# Patient Record
Sex: FEMALE | Race: WHITE | NOT HISPANIC OR LATINO | Employment: PART TIME | URBAN - METROPOLITAN AREA
[De-identification: names, ages, dates, MRNs, and addresses within clinical notes are randomized per-mention and may not be internally consistent; named-entity substitution may affect disease eponyms.]

---

## 2017-09-08 ENCOUNTER — TRANSFERRED RECORDS (OUTPATIENT)
Dept: HEALTH INFORMATION MANAGEMENT | Facility: CLINIC | Age: 67
End: 2017-09-08

## 2017-12-14 ENCOUNTER — TRANSFERRED RECORDS (OUTPATIENT)
Dept: HEALTH INFORMATION MANAGEMENT | Facility: CLINIC | Age: 67
End: 2017-12-14

## 2018-02-09 ENCOUNTER — OFFICE VISIT (OUTPATIENT)
Dept: OTOLARYNGOLOGY | Facility: CLINIC | Age: 68
End: 2018-02-09
Payer: MEDICARE

## 2018-02-09 DIAGNOSIS — J34.89 NASAL SEPTAL PERFORATION: Primary | ICD-10-CM

## 2018-02-09 RX ORDER — LEVOTHYROXINE SODIUM 125 UG/1
125 TABLET ORAL DAILY
COMMUNITY
Start: 2018-01-17

## 2018-02-09 RX ORDER — MONTELUKAST SODIUM 10 MG/1
10 TABLET ORAL AT BEDTIME
COMMUNITY
Start: 2018-02-02

## 2018-02-09 RX ORDER — TEMAZEPAM 30 MG
30 CAPSULE ORAL
COMMUNITY
Start: 2017-08-08

## 2018-02-09 RX ORDER — ESTRADIOL 0.5 MG/1
0.5 TABLET ORAL DAILY
COMMUNITY
Start: 2018-02-02

## 2018-02-09 NOTE — PROGRESS NOTES
"Facial Plastic and Reconstructive Surgery Consultation    Referring Provider:   No referring provider defined for this encounter.    HPI:   I had the pleasure of seeing Leslye Lea today in clinic for consultation for septal perforation.   Leslye Lea is a 67 year old female who presents for management of a septal perforation.   She has never had nasal or septal surgery. She does not have autoimmune disease. She did have nasal trauma when she walked into a door at night. She feels like there may have been \"a bubble and some blood\" that came from the area.     She has seen an otolaryngologist who has performed and autoimmune work up in addition to biopsies. She reports the findings to be negative.    She is disturbed by the whistling and uses acquaphor to cover the hole at night to sleep. She has a history of sinus infections.           Review Of Systems  ROS: 10 point ROS neg other than the symptoms noted above in the HPI.    There is no problem list on file for this patient.    Past Surgical History:   Procedure Laterality Date     BUNIONECTOMY Bilateral      HYSTERECTOMY  2000     THYROIDECTOMY  1999     Current Outpatient Prescriptions   Medication Sig Dispense Refill     estradiol (ESTRACE) 1 MG tablet Take 1 mg by mouth       levothyroxine (SYNTHROID/LEVOTHROID) 125 MCG tablet Take 125 mcg by mouth       montelukast (SINGULAIR) 10 MG tablet Take 10 mg by mouth       temazepam (RESTORIL) 30 MG capsule Take 30 mg by mouth       Seasonal allergies  Social History     Social History     Marital status:      Spouse name: N/A     Number of children: N/A     Years of education: N/A     Occupational History     Not on file.     Social History Main Topics     Smoking status: Not on file     Smokeless tobacco: Not on file     Alcohol use Not on file     Drug use: Not on file     Sexual activity: Not on file     Other Topics Concern     Not on file     Social History Narrative     No narrative on file "     No family history on file.    PE:  Alert and Oriented, Answering Questions Appropriately  Atraumatic, Normocephalic, Face Symmetric  Skin: Forrester 2  Facial Nerve Intact and facial movement symmetric  EOM's, PEERL  Nasal Exam: No external Deformity, no mucopurulence or polyps, no masses, 3mm septal perforation anterior, mucosa looks irritated and dry bilaterally, no ulcerations of lesions otherwise  Chin: Normal   Lips/Teeth/Toungue/Gums: Lips intact, Normal Dentition, Occlusion intact, Oral mucosa intact, no lesions/ulcerations/masses, Tongue mobile  Neck: No lymphadenopathy, no thyromegaly, trachea midline  Chest: No wheezing, cyanosis, or stridor  Card: Normal upper extremity pulses and capillary refill, not diaphoretic  Neuro/Psych: CN's 2-12 intact, Moves all extremities, ambulation in intact, positive affect, no notable muscle weakness      IMPRESSION:    Septal perforation      PLAN:    Leslye Lea has a septal perforation, likely from trauma. I will need to obtain her outside records for work up.  I have placed her on maximal moisture therapy.  I will see her back in 3 months.  I think this is nicely amenable for repair.      Photodocumentation was obtained.     I spent a total of 30 minutes face-to-face with Leslye Lea during today's office visit.  Over 50% of this time was spent counseling the patient and/or coordinating care regarding her septal perforation.  See note for details.

## 2018-02-09 NOTE — LETTER
"2/9/2018       RE: Leslye Lea  [address] [facility] [address]     Dear Colleague,    Thank you for referring your patient, Leslye Lea, to the [facility] at [facility]. Please see a copy of my visit note below.    Facial Plastic and Reconstructive Surgery Consultation    Referring Provider:   No referring provider defined for this encounter.    HPI:   I had the pleasure of seeing Leslye Lea today in clinic for consultation for septal perforation.   Lesley Lea is a 67 year old female who presents for management of a septal perforation.   She has never had nasal or septal surgery. She does not have autoimmune disease. She did have nasal trauma when she walked into a door at night. She feels like there may have been \"a bubble and some blood\" that came from the area.     She has seen an otolaryngologist who has performed and autoimmune work up in addition to biopsies. She reports the findings to be negative.    She is disturbed by the whistling and uses acquaphor to cover the hole at night to sleep. She has a history of sinus infections.     Review Of Systems  ROS: 10 point ROS neg other than the symptoms noted above in the HPI.    There is no problem list on file for this patient.    Past Surgical History:   Procedure Laterality Date     BUNIONECTOMY Bilateral      HYSTERECTOMY  2000     THYROIDECTOMY  1999     Current Outpatient Prescriptions   Medication Sig Dispense Refill     estradiol (ESTRACE) 1 MG tablet Take 1 mg by mouth       levothyroxine (SYNTHROID/LEVOTHROID) 125 MCG tablet Take 125 mcg by mouth       montelukast (SINGULAIR) 10 MG tablet Take 10 mg by mouth       temazepam (RESTORIL) 30 MG capsule Take 30 mg by mouth       Seasonal allergies  Social History     Social History     Marital status:      Spouse name: N/A     Number of children: N/A     Years of education: N/A     Occupational History     Not on file.     Social History " Main Topics     Smoking status: Not on file     Smokeless tobacco: Not on file     Alcohol use Not on file     Drug use: Not on file     Sexual activity: Not on file     Other Topics Concern     Not on file     Social History Narrative     No narrative on file     No family history on file.    PE:  Alert and Oriented, Answering Questions Appropriately  Atraumatic, Normocephalic, Face Symmetric  Skin: Forrester 2  Facial Nerve Intact and facial movement symmetric  EOM's, PEERL  Nasal Exam: No external Deformity, no mucopurulence or polyps, no masses, 3mm septal perforation anterior, mucosa looks irritated and dry bilaterally, no ulcerations of lesions otherwise  Chin: Normal   Lips/Teeth/Toungue/Gums: Lips intact, Normal Dentition, Occlusion intact, Oral mucosa intact, no lesions/ulcerations/masses, Tongue mobile  Neck: No lymphadenopathy, no thyromegaly, trachea midline  Chest: No wheezing, cyanosis, or stridor  Card: Normal upper extremity pulses and capillary refill, not diaphoretic  Neuro/Psych: CN's 2-12 intact, Moves all extremities, ambulation in intact, positive affect, no notable muscle weakness      IMPRESSION:    Septal perforation      PLAN:    Leslye Lea has a septal perforation, likely from trauma. I will need to obtain her outside records for work up.  I have placed her on maximal moisture therapy.  I will see her back in 3 months.  I think this is nicely amenable for repair.    Photodocumentation was obtained.     I spent a total of 30 minutes face-to-face with Leslye Lea during today's office visit.  Over 50% of this time was spent counseling the patient and/or coordinating care regarding her septal perforation.  See note for details.      Sammie Faria MD

## 2018-02-09 NOTE — NURSING NOTE
Photodocumentation taken.  Saline Mist 3x/day, Saline Gel 2x/day, Aquaphor at night.  Follow up in 3 months.      Macho Velazquez RN  2/9/2018 3:49 PM

## 2018-02-22 RX ORDER — ALPRAZOLAM 0.5 MG
0.5 TABLET ORAL AT BEDTIME
COMMUNITY
Start: 2016-10-06 | End: 2023-11-06

## 2018-02-22 RX ORDER — PYRIDOXINE HCL (VITAMIN B6) 100 MG
1000 TABLET ORAL DAILY
COMMUNITY

## 2018-02-22 RX ORDER — BUPROPION HYDROCHLORIDE 150 MG/1
300 TABLET ORAL DAILY
COMMUNITY
Start: 2018-02-02 | End: 2023-11-06

## 2018-02-22 RX ORDER — DIPHENOXYLATE HYDROCHLORIDE AND ATROPINE SULFATE 2.5; .025 MG/1; MG/1
500 TABLET ORAL DAILY
COMMUNITY

## 2018-02-22 RX ORDER — LORATADINE 10 MG/1
10 TABLET ORAL DAILY
COMMUNITY
End: 2023-11-06

## 2018-05-11 ENCOUNTER — OFFICE VISIT (OUTPATIENT)
Dept: OTOLARYNGOLOGY | Facility: CLINIC | Age: 68
End: 2018-05-11
Payer: MEDICARE

## 2018-05-11 DIAGNOSIS — J34.89 NASAL SEPTAL PERFORATION: Primary | ICD-10-CM

## 2018-05-11 NOTE — LETTER
5/11/2018     RE: Leslye Lea  [address] [facility] [address]     Dear Colleague,    Thank you for referring your patient, Leslye Lea, to the [facility] ENT [facility] at [facility]. Please see a copy of my visit note below.    Facial Plastic and Reconstructive Surgery    Leslye Lea has been doing well  She continues to have the 3 mm perforation and is incredibly bothered by it. She cannot sleep at night because of the whistling.    She has vasomotor rhinitis and feels like th ayr gel makes it worse. So she has only been using acquaphor. The lining looks stable, in fact, improved.     I think she is a great candidate for surgery. We talked about the risks, primarily being recurrence of the perforation. She understands I would need to use Alloderm.    She is interested in pursing surgery in the fall. I would approach it through and endonasal approach.     Again, thank you for allowing me to participate in the care of your patient.      Sincerely,    Sammie Faria MD

## 2018-05-11 NOTE — NURSING NOTE
Photodocumentation obtained.  Pre Op teaching completed, gave her soap.  Will obtain PA and then coordinate surgery.    Macho Velazquez RN  5/11/2018 3:34 PM

## 2018-05-11 NOTE — PROGRESS NOTES
Facial Plastic and Reconstructive Surgery    Leslyejoe Lea has been doing well  She continues to have the 3 mm perforation and is incredibly bothered by it. She cannot sleep at night because of the whistling.    She has vasomotor rhinitis and feels like th ayr gel makes it worse. So she has only been using acquaphor. The lining looks stable, in fact, improved.     I think she is a great candidate for surgery. We talked about the risks, primarily being recurrence of the perforation. She understands I would need to use Alloderm.    She is interested in pursing surgery in the fall. I would approach it through and endonasal approach.

## 2018-05-14 DIAGNOSIS — J34.89 NASAL SEPTAL PERFORATION: Primary | ICD-10-CM

## 2018-05-14 RX ORDER — CEFAZOLIN SODIUM 1 G/50ML
1 INJECTION, SOLUTION INTRAVENOUS SEE ADMIN INSTRUCTIONS
Status: CANCELLED | OUTPATIENT
Start: 2018-05-14

## 2018-05-16 ENCOUNTER — TELEPHONE (OUTPATIENT)
Dept: OTOLARYNGOLOGY | Facility: CLINIC | Age: 68
End: 2018-05-16

## 2018-05-16 NOTE — TELEPHONE ENCOUNTER
Called patient and scheduled her surgery with Dr Faria on 9/27/18 @ [facility] per patient request.  Surgery packet, discharge instructions and postop appt 10/5/18 @ 10am at [facility] sent to patient.

## 2018-08-23 ENCOUNTER — TRANSFERRED RECORDS (OUTPATIENT)
Dept: HEALTH INFORMATION MANAGEMENT | Facility: CLINIC | Age: 68
End: 2018-08-23

## 2018-08-27 ENCOUNTER — TELEPHONE (OUTPATIENT)
Dept: OTOLARYNGOLOGY | Facility: CLINIC | Age: 68
End: 2018-08-27

## 2018-08-27 NOTE — TELEPHONE ENCOUNTER
Patient called to report that she was seen in urgent care over the weekend for a reaction to keflex.  She states she had generalized itching all over her body and her lip was swollen. She was then switched to Augmentin 875mg.  She states the itching has resolved however she now feels she has a sinus infection.  Arranged for patient to see Dr. Griffith tomorrow.  She feels surgery went great, just not feeling good from the sinus infection.    Macho Velazquez RN  8/27/2018 10:35 AM

## 2018-08-28 ENCOUNTER — OFFICE VISIT (OUTPATIENT)
Dept: OTOLARYNGOLOGY | Facility: CLINIC | Age: 68
End: 2018-08-28

## 2018-08-28 DIAGNOSIS — Z98.890 POSTOPERATIVE STATE: Primary | ICD-10-CM

## 2018-08-28 NOTE — LETTER
"8/28/2018       RE: Leslye Lea  [address] [facility] [address]     Dear Colleague,    Thank you for referring your patient, Leslye Lea, to the [facility] ENT [facility] at [facility]. Please see a copy of my visit note below.    Post-surgical follow up  August 28, 2018    Leslye was seen in follow up today.  She is one week s/p septal perforation repair with Dr. Faria.  Post-operatively she was put on keflex, however, she reports she developed itchiness with this and was seen in urgent care.  Her antibiotic was changed to augmentin, which she reports she has tolerated without issue.  She reports a history of 4-5 sinus infections per year and reports that she currently feels that she has an infection.  She reports chills but denies having any fevers, she denies green/yellow nasal discharge, and denies facial pain/pressure but explains that she feels \"toxic\", fatigued, and congested as she usually feels when she has a sinus infection.    On exam, Her incisions are healing well.  She has silastic splints sutured in place. On anterior rhinoscopy she has no purulent nasal drainage but does have quite a bit of clear, watery secretions.  Her turbinates are enlarged and nasal passage was are narrow.     I reassured her today and told her that everything looks to be healing well.  I discussed that the splints need to remain in place and she should continue using her nasal saline spray and ointment to her incisions.  I did give her an additional 5 days of antibiotics as well as diflucan per her request.      Again, thank you for allowing me to participate in the care of your patient.      Sincerely,    Raegan Griffith MD      "

## 2018-08-29 NOTE — PROGRESS NOTES
"Post-surgical follow up  August 28, 2018    Leslye was seen in follow up today.  She is one week s/p septal perforation repair with Dr. Faria.  Post-operatively she was put on keflex, however, she reports she developed itchiness with this and was seen in urgent care.  Her antibiotic was changed to augmentin, which she reports she has tolerated without issue.  She reports a history of 4-5 sinus infections per year and reports that she currently feels that she has an infection.  She reports chills but denies having any fevers, she denies green/yellow nasal discharge, and denies facial pain/pressure but explains that she feels \"toxic\", fatigued, and congested as she usually feels when she has a sinus infection.    On exam, Her incisions are healing well.  She has silastic splints sutured in place. On anterior rhinoscopy she has no purulent nasal drainage but does have quite a bit of clear, watery secretions.  Her turbinates are enlarged and nasal passage was are narrow.     I reassured her today and told her that everything looks to be healing well.  I discussed that the splints need to remain in place and she should continue using her nasal saline spray and ointment to her incisions.  I did give her an additional 5 days of antibiotics as well as diflucan per her request.    "

## 2018-09-07 ENCOUNTER — OFFICE VISIT (OUTPATIENT)
Dept: OTOLARYNGOLOGY | Facility: CLINIC | Age: 68
End: 2018-09-07
Payer: MEDICARE

## 2018-09-07 DIAGNOSIS — Z98.890 POSTOPERATIVE STATE: Primary | ICD-10-CM

## 2018-09-07 NOTE — PROGRESS NOTES
Facial Plastic and Reconstructive Surgery    Leslyelouie Lea presents s/p repair of a septal perforation  She was troubled post operatively with sinusitis    I removed splints today, the perforation is closed, but on the left the mucosa looks bruised and fragile, the left is completely intact.    I have counseled her against nasal manipulation  I have her using Ayr gel.    I will see her in a month, she is traveling to [facility]

## 2018-09-07 NOTE — LETTER
9/7/2018       RE: Leslye Lea  [address] [facility] [address]     Dear Colleague,    Thank you for referring your patient, Leslye Lea, to the [facility] at [facility]. Please see a copy of my visit note below.    Facial Plastic and Reconstructive Surgery    Leslye Lea presents s/p repair of a septal perforation  She was troubled post operatively with sinusitis    I removed splints today, the perforation is closed, but on the left the mucosa looks bruised and fragile, the left is completely intact.    I have counseled her against nasal manipulation  I have her using Ayr gel.    I will see her in a month, she is traveling to [facility]    Again, thank you for allowing me to participate in the care of your patient.      Sincerely,    Sammie Faria MD

## 2018-11-30 ENCOUNTER — OFFICE VISIT (OUTPATIENT)
Dept: OTOLARYNGOLOGY | Facility: CLINIC | Age: 68
End: 2018-11-30
Payer: MEDICARE

## 2018-11-30 DIAGNOSIS — H57.813 BROW PTOSIS, BILATERAL: Primary | ICD-10-CM

## 2018-11-30 NOTE — PROGRESS NOTES
Facial Plastic and Reconstructive Surgery      Leslye Lea had a septal perforation repair in September  She then took a trip  It was quite dry and friable when I saw her in the post op period    She was allergic to the Ayr gel, she brought in her extras today  She uses acquaphor    Her septum is closed nicely  I am very happy.  It is a little dry, she needs to ramp up moisture.    She does however complain of visual obstruction.    FUNCTIONAL COMPLAINTS RELATED TO DROOPY EYELIDS/BROWS:  Leslye Lea describes upper lids interfering with superior visual field and interfering with activities of daily living including reading, driving and watching television.      EXAM: Dermatochalasis with excess skin touching the eyelids  Brow ptosis with brow resting below the superior orbital rim  Lids resting on eyelashes obstructing the temporal visual axis     MRD1: Right eye 0   Left eye 0    I have referred her for visual field testing today.

## 2018-11-30 NOTE — LETTER
11/30/2018       RE: Leslye Lea  [address] [facility] [address]     Dear Colleague,    Thank you for referring your patient, Leslye Lea, to the [facility] ENT [facility] at [facility]. Please see a copy of my visit note below.    Facial Plastic and Reconstructive Surgery    Leslye Lea had a septal perforation repair in September  She then took a trip  It was quite dry and friable when I saw her in the post op period    She was allergic to the Ayr gel, she brought in her extras today  She uses acquaphor    Her septum is closed nicely  I am very happy.  It is a little dry, she needs to ramp up moisture.    She does however complain of visual obstruction.    FUNCTIONAL COMPLAINTS RELATED TO DROOPY EYELIDS/BROWS:  Leslye Lea describes upper lids interfering with superior visual field and interfering with activities of daily living including reading, driving and watching television.      EXAM: Dermatochalasis with excess skin touching the eyelids  Brow ptosis with brow resting below the superior orbital rim  Lids resting on eyelashes obstructing the temporal visual axis     MRD1: Right eye 0   Left eye 0  I have referred her for visual field testing today.    Sammie Faria MD

## 2018-12-03 NOTE — NURSING NOTE
Updated photodocumentation obtained.  Patient does want to have Dr. Twin Frazier do her upper eyelid surgery for her.  She will need VFT done - I highly recommend that she go to our eye clinic to have that done - she understands but wants to wait a little longer before having the surgery.      She will call me back when she is ready.  I did tell her that there is a lot of time needed up front for prior authorization process, she understands that and will call.    Macho Velazquez RN  12/3/2018 9:10 AM

## 2019-03-29 ENCOUNTER — ALLIED HEALTH/NURSE VISIT (OUTPATIENT)
Dept: OPHTHALMOLOGY | Facility: CLINIC | Age: 69
End: 2019-03-29
Payer: MEDICARE

## 2019-03-29 DIAGNOSIS — H02.419 MECHANICAL PTOSIS: Primary | ICD-10-CM

## 2019-07-15 ENCOUNTER — TELEPHONE (OUTPATIENT)
Dept: PLASTIC SURGERY | Facility: CLINIC | Age: 69
End: 2019-07-15

## 2019-07-15 DIAGNOSIS — H57.813 BROW PTOSIS, BILATERAL: ICD-10-CM

## 2019-07-15 DIAGNOSIS — H02.839 DERMATOCHALASIA: Primary | ICD-10-CM

## 2019-07-15 DIAGNOSIS — H53.40 VISUAL FIELD DEFECT: ICD-10-CM

## 2019-07-15 NOTE — TELEPHONE ENCOUNTER
Patient called and expressed that she would like to proceed having upper eyelid and brow surgery.  Patient has had visual field testing done.  Will work to obtain PA and then work to schedule the surgery.      Patient would like to return to clinic at some point to discuss surgeries again.    Macho Velazquez RN  7/15/2019 10:27 AM

## 2019-09-26 NOTE — TELEPHONE ENCOUNTER
09/04/19 per Sandi Gant surgery on 10/10/19 she is having a knee replacement and will call us when she is ready to schedule.

## 2020-03-11 ENCOUNTER — HEALTH MAINTENANCE LETTER (OUTPATIENT)
Age: 70
End: 2020-03-11

## 2021-01-03 ENCOUNTER — HEALTH MAINTENANCE LETTER (OUTPATIENT)
Age: 71
End: 2021-01-03

## 2021-02-05 ENCOUNTER — OFFICE VISIT (OUTPATIENT)
Dept: PLASTIC SURGERY | Facility: CLINIC | Age: 71
End: 2021-02-05
Payer: MEDICARE

## 2021-02-05 DIAGNOSIS — H53.483 VISUAL FIELD CONTRACTION, BILATERAL: ICD-10-CM

## 2021-02-05 DIAGNOSIS — H02.834 DERMATOCHALASIS OF BOTH UPPER EYELIDS: ICD-10-CM

## 2021-02-05 DIAGNOSIS — H57.819 BROW PTOSIS: Primary | ICD-10-CM

## 2021-02-05 DIAGNOSIS — H53.40 VISUAL FIELD DEFECT: ICD-10-CM

## 2021-02-05 DIAGNOSIS — H02.831 DERMATOCHALASIS OF BOTH UPPER EYELIDS: ICD-10-CM

## 2021-02-05 NOTE — LETTER
2/5/2021       RE: Leslye Lea  [address] [facility] [address]     Dear Colleague,    Thank you for referring your patient, Leslye Lea, to the [facility] at [facility]. Please see a copy of my visit note below.    Facial Plastic and Reconstructive Surgery    Leslye Lea comes in today to discuss brow lift once again.  She has had a failed visual field test in the past with significant brow ptosis on examination.  We discussed brow lift procedure in the past at that time however she required right knee replacement surgery and proceeded with that.  She has recovered well since that and is now here to discuss correcting her brow position.  She feels like it is quite troublesome.  She has significant difficulty in the evenings when she is tired but she also has feels like she constantly has a curtain over the corner of her eyes.  The right is more symptomatic than the left.  If she holds her brow superiorly she says it completely resolves her problem.    PMH, PSH, medications, allergies were reviewed and are unchanged since last visit.  She has since last seen had her right knee replaced.  She currently has some loose ligaments.    On examination she has bilateral brow ptosis that significant.  Her hairbearing brow is below the superior orbital rim bilaterally.  She has significant excess of skin with hooding contacting and over laughing her eye lashes.  The breast can be elevated manually and this leads to significant improvement in her visual field.  She notes it is a significant before and after change with quite a direct correction. Lids resting on eyelashes obstructing the temporal visual axis.  Intranasal examination demonstrates that her septum is nicely repaired with no evidence of recurrence of perforation.  She has a little bit of dried mucus on the left side of encourage her to use some ointment.    Assessment and plan:    Leslye Lea  describes upper lids interfering with superior visual field and interfering with activities of daily living including reading, driving and watching television.  She has failed a visual field test in the past and we will see if she needs to have a repeat one in order to qualify for surgical correction.     She would benefit from bilateral brow ptosis.  This is not just an upper eyelid skin problem is primarily due to brow ptosis and she would require elevation of both brows.  I discussed that surgery with her today.  She is interested in proceeding.  I think this is fundamental for her vision and her safety and she feels quite motivated to have surgery due to the significant functional deficit that she feels.    I spent a total of  25 minutes face-to-face with Leslye Lea during today's office visit.  Over 50% of this time was spent counseling the patient and/or coordinating care regarding visual field obstruction from brow ptosis.  See note for details.    Again, thank you for allowing me to participate in the care of your patient.      Sincerely,    Sammie Faria MD

## 2021-02-05 NOTE — PROGRESS NOTES
Facial Plastic and Reconstructive Surgery      Leslye Lea comes in today to discuss brow lift once again.  She has had a failed visual field test in the past with significant brow ptosis on examination.  We discussed brow lift procedure in the past at that time however she required right knee replacement surgery and proceeded with that.  She has recovered well since that and is now here to discuss correcting her brow position.  She feels like it is quite troublesome.  She has significant difficulty in the evenings when she is tired but she also has feels like she constantly has a curtain over the corner of her eyes.  The right is more symptomatic than the left.  If she holds her brow superiorly she says it completely resolves her problem.    PMH, PSH, medications, allergies were reviewed and are unchanged since last visit.  She has since last seen had her right knee replaced.  She currently has some loose ligaments.    On examination she has bilateral brow ptosis that significant.  Her hairbearing brow is below the superior orbital rim bilaterally.  She has significant excess of skin with hooding contacting and over laughing her eye lashes.  The breast can be elevated manually and this leads to significant improvement in her visual field.  She notes it is a significant before and after change with quite a direct correction. Lids resting on eyelashes obstructing the temporal visual axis.  Intranasal examination demonstrates that her septum is nicely repaired with no evidence of recurrence of perforation.  She has a little bit of dried mucus on the left side of encourage her to use some ointment.        Assessment and plan:    Leslye Lea describes upper lids interfering with superior visual field and interfering with activities of daily living including reading, driving and watching television.  She has failed a visual field test in the past and we will see if she needs to have a repeat one in order to  qualify for surgical correction.     She would benefit from bilateral brow ptosis.  This is not just an upper eyelid skin problem is primarily due to brow ptosis and she would require elevation of both brows.  I discussed that surgery with her today.  She is interested in proceeding.  I think this is fundamental for her vision and her safety and she feels quite motivated to have surgery due to the significant functional deficit that she feels.      I spent a total of  25 minutes face-to-face with Leslye Lea during today's office visit.  Over 50% of this time was spent counseling the patient and/or coordinating care regarding visual field obstruction from brow ptosis.  See note for details.

## 2021-02-05 NOTE — LETTER
February 5, 2021  Re: Leslye Lea  1950    Dear Dr. Norris ref. provider found,    Thank you so much for referring Leslye Lea to the [facility]. I had the pleasure of visiting with Leslye today.     Attached you will find a copy of my note. Please feel free to reach out to me with any questions, [phone].     Facial Plastic and Reconstructive Surgery      Leslye Lea comes in today to discuss brow lift once again.  She has had a failed visual field test in the past with significant brow ptosis on examination.  We discussed brow lift procedure in the past at that time however she required right knee replacement surgery and proceeded with that.  She has recovered well since that and is now here to discuss correcting her brow position.  She feels like it is quite troublesome.  She has significant difficulty in the evenings when she is tired but she also has feels like she constantly has a curtain over the corner of her eyes.  The right is more symptomatic than the left.  If she holds her brow superiorly she says it completely resolves her problem.    PMH, PSH, medications, allergies were reviewed and are unchanged since last visit.  She has since last seen had her right knee replaced.  She currently has some loose ligaments.    On examination she has bilateral brow ptosis that significant.  Her hairbearing brow is below the superior orbital rim bilaterally.  She has significant excess of skin with hooding contacting and over laughing her eye lashes.  The breast can be elevated manually and this leads to significant improvement in her visual field.  She notes it is a significant before and after change with quite a direct correction. Lids resting on eyelashes obstructing the temporal visual axis.  Intranasal examination demonstrates that her septum is nicely repaired with no evidence of recurrence of perforation.  She has a little bit of dried mucus on the left side of encourage her to use some  ointment.        Assessment and plan:    Leslye Lea describes upper lids interfering with superior visual field and interfering with activities of daily living including reading, driving and watching television.  She has failed a visual field test in the past and we will see if she needs to have a repeat one in order to qualify for surgical correction.     She would benefit from bilateral brow ptosis.  This is not just an upper eyelid skin problem is primarily due to brow ptosis and she would require elevation of both brows.  I discussed that surgery with her today.  She is interested in proceeding.  I think this is fundamental for her vision and her safety and she feels quite motivated to have surgery due to the significant functional deficit that she feels.      I spent a total of  25 minutes face-to-face with Leslye Lea during today's office visit.  Over 50% of this time was spent counseling the patient and/or coordinating care regarding visual field obstruction from brow ptosis.  See note for details.                 Your trust in our practice and care is much appreciated.    Sincerely,  Sammie Faria MD

## 2021-02-09 NOTE — NURSING NOTE
Updated photodocumentation obtained.    Will work to obtain PA for Bilateral Upper Eyelid Blepharoplasty and Pretrichial Browlift.    Stephanie Alcaraz RN  2/9/2021 11:12 AM

## 2021-02-11 PROBLEM — H02.834 DERMATOCHALASIS OF BOTH UPPER EYELIDS: Status: ACTIVE | Noted: 2021-02-11

## 2021-02-11 PROBLEM — H02.831 DERMATOCHALASIS OF BOTH UPPER EYELIDS: Status: ACTIVE | Noted: 2021-02-11

## 2021-02-11 PROBLEM — H53.40 VISUAL FIELD DEFECT: Status: ACTIVE | Noted: 2021-02-11

## 2021-02-23 ENCOUNTER — TELEPHONE (OUTPATIENT)
Dept: OTOLARYNGOLOGY | Facility: CLINIC | Age: 71
End: 2021-02-23

## 2021-02-24 NOTE — TELEPHONE ENCOUNTER
Called patient to schedule surgery with Dr. Twin Frazier. Offered patient first available 5/13/2021 at [facility]. Patient was agreeable to this date. Will schedule pre-op with her pcp within 30 days and a covid-19 test is required within 4 days. Patient aware and would like this information mailed. Writer will mail information out to patient home address.   Asked patient to call with any questions or concerns.Approximate arrival time was provided to patient.     Bridgette Pearl   Perioperative Coordinator   Department of Otolaryngology  P: [phone]

## 2021-04-25 ENCOUNTER — HEALTH MAINTENANCE LETTER (OUTPATIENT)
Age: 71
End: 2021-04-25

## 2021-04-30 DIAGNOSIS — Z11.59 ENCOUNTER FOR SCREENING FOR OTHER VIRAL DISEASES: ICD-10-CM

## 2021-05-10 ENCOUNTER — AMBULATORY - HEALTHEAST (OUTPATIENT)
Dept: LAB | Facility: CLINIC | Age: 71
End: 2021-05-10

## 2021-05-10 ENCOUNTER — RECORDS - HEALTHEAST (OUTPATIENT)
Dept: ADMINISTRATIVE | Facility: OTHER | Age: 71
End: 2021-05-10

## 2021-05-10 DIAGNOSIS — Z11.59 SCREENING FOR VIRAL DISEASE: ICD-10-CM

## 2021-05-11 LAB
SARS-COV-2 PCR COMMENT: NORMAL
SARS-COV-2 RNA SPEC QL NAA+PROBE: NEGATIVE
SARS-COV-2 VIRUS SPECIMEN SOURCE: NORMAL

## 2021-05-12 ENCOUNTER — ANESTHESIA EVENT (OUTPATIENT)
Dept: SURGERY | Facility: AMBULATORY SURGERY CENTER | Age: 71
End: 2021-05-12
Payer: MEDICARE

## 2021-05-12 ENCOUNTER — COMMUNICATION - HEALTHEAST (OUTPATIENT)
Dept: SCHEDULING | Facility: CLINIC | Age: 71
End: 2021-05-12

## 2021-05-12 RX ORDER — SERTRALINE HYDROCHLORIDE 100 MG/1
150 TABLET, FILM COATED ORAL DAILY
COMMUNITY

## 2021-05-13 ENCOUNTER — HOSPITAL ENCOUNTER (OUTPATIENT)
Facility: AMBULATORY SURGERY CENTER | Age: 71
End: 2021-05-13
Attending: OTOLARYNGOLOGY
Payer: MEDICARE

## 2021-05-13 ENCOUNTER — ANESTHESIA (OUTPATIENT)
Dept: SURGERY | Facility: AMBULATORY SURGERY CENTER | Age: 71
End: 2021-05-13
Payer: MEDICARE

## 2021-05-13 VITALS
RESPIRATION RATE: 14 BRPM | SYSTOLIC BLOOD PRESSURE: 113 MMHG | OXYGEN SATURATION: 94 % | BODY MASS INDEX: 20.66 KG/M2 | TEMPERATURE: 98 F | HEART RATE: 86 BPM | HEIGHT: 64 IN | WEIGHT: 121 LBS | DIASTOLIC BLOOD PRESSURE: 60 MMHG

## 2021-05-13 DIAGNOSIS — H02.834 DERMATOCHALASIS OF BOTH UPPER EYELIDS: ICD-10-CM

## 2021-05-13 DIAGNOSIS — H02.831 DERMATOCHALASIS OF BOTH UPPER EYELIDS: ICD-10-CM

## 2021-05-13 DIAGNOSIS — H57.819 BROW PTOSIS: ICD-10-CM

## 2021-05-13 DIAGNOSIS — H53.40 VISUAL FIELD DEFECT: ICD-10-CM

## 2021-05-13 PROCEDURE — 15823 BLEPHARP UPR EYELID XCSV SKN: CPT | Mod: E3

## 2021-05-13 PROCEDURE — 67900 REPAIR BROW DEFECT: CPT | Mod: RT

## 2021-05-13 RX ORDER — SODIUM CHLORIDE, SODIUM LACTATE, POTASSIUM CHLORIDE, CALCIUM CHLORIDE 600; 310; 30; 20 MG/100ML; MG/100ML; MG/100ML; MG/100ML
INJECTION, SOLUTION INTRAVENOUS CONTINUOUS
Status: DISCONTINUED | OUTPATIENT
Start: 2021-05-13 | End: 2021-05-13 | Stop reason: HOSPADM

## 2021-05-13 RX ORDER — LIDOCAINE HYDROCHLORIDE AND EPINEPHRINE 10; 10 MG/ML; UG/ML
INJECTION, SOLUTION INFILTRATION; PERINEURAL PRN
Status: DISCONTINUED | OUTPATIENT
Start: 2021-05-13 | End: 2021-05-13 | Stop reason: HOSPADM

## 2021-05-13 RX ORDER — BUPIVACAINE HYDROCHLORIDE 5 MG/ML
INJECTION, SOLUTION PERINEURAL PRN
Status: DISCONTINUED | OUTPATIENT
Start: 2021-05-13 | End: 2021-05-13 | Stop reason: HOSPADM

## 2021-05-13 RX ORDER — LIDOCAINE 40 MG/G
CREAM TOPICAL
Status: DISCONTINUED | OUTPATIENT
Start: 2021-05-13 | End: 2021-05-13 | Stop reason: HOSPADM

## 2021-05-13 RX ORDER — NALOXONE HYDROCHLORIDE 0.4 MG/ML
0.2 INJECTION, SOLUTION INTRAMUSCULAR; INTRAVENOUS; SUBCUTANEOUS
Status: DISCONTINUED | OUTPATIENT
Start: 2021-05-13 | End: 2021-05-14 | Stop reason: HOSPADM

## 2021-05-13 RX ORDER — NALOXONE HYDROCHLORIDE 0.4 MG/ML
0.4 INJECTION, SOLUTION INTRAMUSCULAR; INTRAVENOUS; SUBCUTANEOUS
Status: DISCONTINUED | OUTPATIENT
Start: 2021-05-13 | End: 2021-05-14 | Stop reason: HOSPADM

## 2021-05-13 RX ORDER — LIDOCAINE HYDROCHLORIDE 20 MG/ML
INJECTION, SOLUTION INFILTRATION; PERINEURAL PRN
Status: DISCONTINUED | OUTPATIENT
Start: 2021-05-13 | End: 2021-05-13

## 2021-05-13 RX ORDER — PROPOFOL 10 MG/ML
INJECTION, EMULSION INTRAVENOUS CONTINUOUS PRN
Status: DISCONTINUED | OUTPATIENT
Start: 2021-05-13 | End: 2021-05-13

## 2021-05-13 RX ORDER — GLYCOPYRROLATE 0.2 MG/ML
INJECTION, SOLUTION INTRAMUSCULAR; INTRAVENOUS PRN
Status: DISCONTINUED | OUTPATIENT
Start: 2021-05-13 | End: 2021-05-13

## 2021-05-13 RX ORDER — ONDANSETRON 2 MG/ML
INJECTION INTRAMUSCULAR; INTRAVENOUS PRN
Status: DISCONTINUED | OUTPATIENT
Start: 2021-05-13 | End: 2021-05-13

## 2021-05-13 RX ORDER — PROPOFOL 10 MG/ML
INJECTION, EMULSION INTRAVENOUS PRN
Status: DISCONTINUED | OUTPATIENT
Start: 2021-05-13 | End: 2021-05-13

## 2021-05-13 RX ORDER — FENTANYL CITRATE 50 UG/ML
INJECTION, SOLUTION INTRAMUSCULAR; INTRAVENOUS PRN
Status: DISCONTINUED | OUTPATIENT
Start: 2021-05-13 | End: 2021-05-13

## 2021-05-13 RX ORDER — ERYTHROMYCIN 5 MG/G
0.5 OINTMENT OPHTHALMIC AT BEDTIME
Qty: 3.5 G | Refills: 0 | Status: SHIPPED | OUTPATIENT
Start: 2021-05-13 | End: 2023-11-06

## 2021-05-13 RX ORDER — OXYCODONE HYDROCHLORIDE 5 MG/1
5-10 TABLET ORAL EVERY 4 HOURS PRN
Qty: 30 TABLET | Refills: 0 | Status: SHIPPED | OUTPATIENT
Start: 2021-05-13 | End: 2023-11-06

## 2021-05-13 RX ORDER — AMOXICILLIN 250 MG
1-2 CAPSULE ORAL 2 TIMES DAILY
Qty: 30 TABLET | Refills: 0 | Status: SHIPPED | OUTPATIENT
Start: 2021-05-13 | End: 2023-11-06

## 2021-05-13 RX ORDER — GINSENG 100 MG
CAPSULE ORAL PRN
Status: DISCONTINUED | OUTPATIENT
Start: 2021-05-13 | End: 2021-05-13 | Stop reason: HOSPADM

## 2021-05-13 RX ORDER — FENTANYL CITRATE 50 UG/ML
25-50 INJECTION, SOLUTION INTRAMUSCULAR; INTRAVENOUS
Status: DISCONTINUED | OUTPATIENT
Start: 2021-05-13 | End: 2021-05-13 | Stop reason: HOSPADM

## 2021-05-13 RX ORDER — ERYTHROMYCIN 5 MG/G
OINTMENT OPHTHALMIC PRN
Status: DISCONTINUED | OUTPATIENT
Start: 2021-05-13 | End: 2021-05-13 | Stop reason: HOSPADM

## 2021-05-13 RX ORDER — ONDANSETRON 4 MG/1
4-8 TABLET, ORALLY DISINTEGRATING ORAL EVERY 8 HOURS PRN
Qty: 30 TABLET | Refills: 0 | Status: SHIPPED | OUTPATIENT
Start: 2021-05-13 | End: 2023-11-06

## 2021-05-13 RX ORDER — MEPERIDINE HYDROCHLORIDE 25 MG/ML
12.5 INJECTION INTRAMUSCULAR; INTRAVENOUS; SUBCUTANEOUS
Status: DISCONTINUED | OUTPATIENT
Start: 2021-05-13 | End: 2021-05-14 | Stop reason: HOSPADM

## 2021-05-13 RX ORDER — ONDANSETRON 4 MG/1
4 TABLET, ORALLY DISINTEGRATING ORAL EVERY 30 MIN PRN
Status: DISCONTINUED | OUTPATIENT
Start: 2021-05-13 | End: 2021-05-14 | Stop reason: HOSPADM

## 2021-05-13 RX ORDER — ONDANSETRON 2 MG/ML
4 INJECTION INTRAMUSCULAR; INTRAVENOUS EVERY 30 MIN PRN
Status: DISCONTINUED | OUTPATIENT
Start: 2021-05-13 | End: 2021-05-14 | Stop reason: HOSPADM

## 2021-05-13 RX ORDER — SODIUM CHLORIDE, SODIUM LACTATE, POTASSIUM CHLORIDE, CALCIUM CHLORIDE 600; 310; 30; 20 MG/100ML; MG/100ML; MG/100ML; MG/100ML
INJECTION, SOLUTION INTRAVENOUS CONTINUOUS
Status: DISCONTINUED | OUTPATIENT
Start: 2021-05-13 | End: 2021-05-14 | Stop reason: HOSPADM

## 2021-05-13 RX ORDER — EPHEDRINE SULFATE 50 MG/ML
INJECTION, SOLUTION INTRAMUSCULAR; INTRAVENOUS; SUBCUTANEOUS PRN
Status: DISCONTINUED | OUTPATIENT
Start: 2021-05-13 | End: 2021-05-13

## 2021-05-13 RX ORDER — CLINDAMYCIN PHOSPHATE 900 MG/50ML
900 INJECTION, SOLUTION INTRAVENOUS SEE ADMIN INSTRUCTIONS
Status: DISCONTINUED | OUTPATIENT
Start: 2021-05-13 | End: 2021-05-13 | Stop reason: HOSPADM

## 2021-05-13 RX ORDER — ACETAMINOPHEN 325 MG/1
975 TABLET ORAL ONCE
Status: COMPLETED | OUTPATIENT
Start: 2021-05-13 | End: 2021-05-13

## 2021-05-13 RX ORDER — OXYCODONE HYDROCHLORIDE 5 MG/1
5 TABLET ORAL EVERY 4 HOURS PRN
Status: DISCONTINUED | OUTPATIENT
Start: 2021-05-13 | End: 2021-05-14 | Stop reason: HOSPADM

## 2021-05-13 RX ORDER — DEXAMETHASONE SODIUM PHOSPHATE 4 MG/ML
INJECTION, SOLUTION INTRA-ARTICULAR; INTRALESIONAL; INTRAMUSCULAR; INTRAVENOUS; SOFT TISSUE PRN
Status: DISCONTINUED | OUTPATIENT
Start: 2021-05-13 | End: 2021-05-13

## 2021-05-13 RX ORDER — GABAPENTIN 100 MG/1
100 CAPSULE ORAL ONCE
Status: COMPLETED | OUTPATIENT
Start: 2021-05-13 | End: 2021-05-13

## 2021-05-13 RX ORDER — BALANCED SALT SOLUTION 6.4; .75; .48; .3; 3.9; 1.7 MG/ML; MG/ML; MG/ML; MG/ML; MG/ML; MG/ML
SOLUTION OPHTHALMIC PRN
Status: DISCONTINUED | OUTPATIENT
Start: 2021-05-13 | End: 2021-05-13 | Stop reason: HOSPADM

## 2021-05-13 RX ORDER — CLINDAMYCIN PHOSPHATE 900 MG/50ML
900 INJECTION, SOLUTION INTRAVENOUS
Status: COMPLETED | OUTPATIENT
Start: 2021-05-13 | End: 2021-05-13

## 2021-05-13 RX ADMIN — FENTANYL CITRATE 50 MCG: 50 INJECTION, SOLUTION INTRAMUSCULAR; INTRAVENOUS at 10:21

## 2021-05-13 RX ADMIN — Medication 30 MG: at 10:22

## 2021-05-13 RX ADMIN — PROPOFOL 30 MG: 10 INJECTION, EMULSION INTRAVENOUS at 12:40

## 2021-05-13 RX ADMIN — FENTANYL CITRATE 50 MCG: 50 INJECTION, SOLUTION INTRAMUSCULAR; INTRAVENOUS at 11:36

## 2021-05-13 RX ADMIN — FENTANYL CITRATE 25 MCG: 50 INJECTION, SOLUTION INTRAMUSCULAR; INTRAVENOUS at 13:57

## 2021-05-13 RX ADMIN — FENTANYL CITRATE 25 MCG: 50 INJECTION, SOLUTION INTRAMUSCULAR; INTRAVENOUS at 13:52

## 2021-05-13 RX ADMIN — Medication 20 MG: at 11:24

## 2021-05-13 RX ADMIN — LIDOCAINE HYDROCHLORIDE 60 MG: 20 INJECTION, SOLUTION INFILTRATION; PERINEURAL at 10:21

## 2021-05-13 RX ADMIN — OXYCODONE HYDROCHLORIDE 5 MG: 5 TABLET ORAL at 13:49

## 2021-05-13 RX ADMIN — FENTANYL CITRATE 25 MCG: 50 INJECTION, SOLUTION INTRAMUSCULAR; INTRAVENOUS at 14:03

## 2021-05-13 RX ADMIN — CLINDAMYCIN PHOSPHATE 900 MG: 900 INJECTION, SOLUTION INTRAVENOUS at 10:05

## 2021-05-13 RX ADMIN — EPHEDRINE SULFATE 10 MG: 50 INJECTION, SOLUTION INTRAMUSCULAR; INTRAVENOUS; SUBCUTANEOUS at 10:49

## 2021-05-13 RX ADMIN — PROPOFOL: 10 INJECTION, EMULSION INTRAVENOUS at 11:52

## 2021-05-13 RX ADMIN — PROPOFOL 150 MCG/KG/MIN: 10 INJECTION, EMULSION INTRAVENOUS at 10:21

## 2021-05-13 RX ADMIN — ACETAMINOPHEN 975 MG: 325 TABLET ORAL at 09:39

## 2021-05-13 RX ADMIN — PROPOFOL: 10 INJECTION, EMULSION INTRAVENOUS at 12:35

## 2021-05-13 RX ADMIN — Medication 0.5 MG: at 12:22

## 2021-05-13 RX ADMIN — EPHEDRINE SULFATE 10 MG: 50 INJECTION, SOLUTION INTRAMUSCULAR; INTRAVENOUS; SUBCUTANEOUS at 10:55

## 2021-05-13 RX ADMIN — DEXAMETHASONE SODIUM PHOSPHATE 4 MG: 4 INJECTION, SOLUTION INTRA-ARTICULAR; INTRALESIONAL; INTRAMUSCULAR; INTRAVENOUS; SOFT TISSUE at 10:33

## 2021-05-13 RX ADMIN — GLYCOPYRROLATE 0.2 MG: 0.2 INJECTION, SOLUTION INTRAMUSCULAR; INTRAVENOUS at 11:11

## 2021-05-13 RX ADMIN — EPHEDRINE SULFATE 5 MG: 50 INJECTION, SOLUTION INTRAMUSCULAR; INTRAVENOUS; SUBCUTANEOUS at 11:07

## 2021-05-13 RX ADMIN — ONDANSETRON 4 MG: 2 INJECTION INTRAMUSCULAR; INTRAVENOUS at 10:05

## 2021-05-13 RX ADMIN — PROPOFOL 100 MG: 10 INJECTION, EMULSION INTRAVENOUS at 10:21

## 2021-05-13 RX ADMIN — GABAPENTIN 100 MG: 100 CAPSULE ORAL at 09:42

## 2021-05-13 RX ADMIN — SODIUM CHLORIDE, SODIUM LACTATE, POTASSIUM CHLORIDE, CALCIUM CHLORIDE: 600; 310; 30; 20 INJECTION, SOLUTION INTRAVENOUS at 09:38

## 2021-05-13 ASSESSMENT — MIFFLIN-ST. JEOR: SCORE: 1063.85

## 2021-05-13 NOTE — OP NOTE
Operative report   5/13/2021    PRIMARY SURGEON:  Sammie Faria M.D.      RESIDENT SURGEON:  Mannie Silvestre M.D.      PREOPERATIVE DIAGNOSES:     1.  Acquired ptosis of eyelids bilaterally.  2.  Brow ptosis bilaterally.  3.  Dermatochalasis of both upper eyelids.     POSTOPERATIVE DIAGNOSIS:   1.  Acquired ptosis of eyelids bilaterally.  2.  Brow ptosis bilaterally.  3.  Dermatochalasis of both upper eyelids.     PROCEDURE PERFORMED:    1.  Bilateral upper lid blepharoplasty.  2.  A bilateral pretricheal brow lift.     ANESTHESIA:  General.     BLOOD LOSS:  30 mL     FINDINGS: There was 8 mm measured from the bilateral upper eyelid margin to the superior aspect of the tarsal plate.  We then measured 12 mm from the inferior aspect of the brow and resected the remaining skin between, leaving greater than 2 cm of upper lid skin. The pretracheal brow lift provided sufficient brow suspension and a segment of skin was excised from the bilateral scalp during this intervention.     INDICATIONS FOR PROCEDURE:  Leslye Lea is well known to the ENT service.  She is a 70-year-old female with brow ptosis and dermatochalasis causing visual disturbance and loss of visual fields.  Because of this, the risks and benefits of the above procedures was discussed with the patient and elective surgical consent was obtained.     DESCRIPTION OF PROCEDURE:  The patient was met in the preoperative area where again the detailed risks and benefits were discussed, elective surgical consent was obtained.  The patient was wheeled into the care of the anesthesia providers to the operative suite, placed supine on the operating table.  Monitoring leads were placed as appropriate.  General anesthetic was introduced.  The patient was orotracheally intubated.  The head of the bed was rotated 90 degrees.  Pressure points were appropriately padded.  The facility protocol  timeout identifying the patient and the operative site and the procedure to be  performed.  All parties were in agreement.  Next, the planned incisions were marked with a sterile marking pen. The measurements for the upper blepharoplasty were obtained using a caliper, we noted 8 mm, which as anticipated corresponded with the margin of the upper lid and the tarsal plate.  We then identified the lower aspect of the brow and measured to make sure that we preserved 2 cm skin into the upper lid.  We then marked the planned surgical excision area and using the green forceps, we measured the appropriate laxity of the lid.  Next, we marked the planned incision in the pretracheal scalp and local anesthetic was introduced.  We used 1% with 1:100,000 in the neck and for the pretracheal incision and ophthalmic solution of 0.25% bupivacaine with 1:100,000 epinephrine on the lid.  Next, the surgical site was sterilely prepped and draped as typical for this procedure.  A 15 blade was used to incise the skin of the upper lid and a high temp cautery was used to preserve the orbicularis oculi as we removed the skin of the upper lid that was marked.  A 5-0 fast was used to close the skin in the upper lid.  This was performed with Myers needle drivers.  A #15 blade was then used to incise the pretracheal area and dissection continued to identify the galea on through.  We identified the periosteum and in the midline we dissected in the subperiosteal plane using periosteal elevators to dissect and identify the arcuate marginalis.  This was gently reflected off the superior brow preserving the neurovascular bundles bilaterally.  Laterally, we identified the temporalis muscle and using a 15 blade incised the temporal line, identifying the temporoparietal fascia and the deep temporal fascia plane.  Dissection continued along the deep temporal fascia using blunt dissection only as to avoid injury to the frontal branch of the facial nerve.  Once the fascia was sufficiently elevated, the periosteum of the suprabrow was  suspended to the periosteum of the vertex scalp using a 3-0 Prolene stitch in multiple areas.  We then incised the pretracheal skin excess with a 15 blade and closed the incision in multiple layers, reapproximating the galea as well as the skin.  Ophthalmic bacitracin was applied to the incisions and a fluff gauze and sterile head wrap was placed after the patient's hair had been washed.  The patient was then returned to the care of the anesthesia providers where they were awakened and extubated without complication.  Then transitioned of the PACU in stable condition.  All surgical counts were correct at the end of the case and Dr. Marshall was present and participated for the entire procedure.     Mannie Silvestre MD  ENT resident     I was present, scrubbed for the entire procedure and performed key aspects. I agree with the note.     TOBI MARSHALL MD

## 2021-05-13 NOTE — ANESTHESIA PREPROCEDURE EVALUATION
Anesthesia Pre-Procedure Evaluation    Patient: Leslye Lea   MRN: [id number] : 1950        Preoperative Diagnosis: Brow ptosis [H57.819]  Dermatochalasis of both upper eyelids [H02.831, H02.834]  Visual field defect [H53.40]   Procedure : Procedure(s):  Bilateral Upper Eyelid Blepharoplasty, Bilateral Pretrichial Browlift     Past Medical History:   Diagnosis Date     Hypothyroid       Past Surgical History:   Procedure Laterality Date     APPENDECTOMY  2001     BUNIONECTOMY Bilateral      excision of cecum       hemmorrhoidectomy  2005     HYSTERECTOMY  2000     lymph node biopsy       THYROIDECTOMY        BREAST CYST ASPIRATION (GICH) Bilateral       Allergies   Allergen Reactions     Keflex [Cephalexin]      Lip swelling, urticaria     Seasonal Allergies       Social History     Tobacco Use     Smoking status: Never Smoker     Smokeless tobacco: Never Used   Substance Use Topics     Alcohol use: Yes     Comment: occas      Wt Readings from Last 1 Encounters:   21 54.9 kg (121 lb)        Anesthesia Evaluation   Pt has had prior anesthetic. Type: General.        ROS/MED HX  ENT/Pulmonary:  - neg pulmonary ROS     Neurologic:  - neg neurologic ROS     Cardiovascular:  - neg cardiovascular ROS     METS/Exercise Tolerance:     Hematologic:  - neg hematologic  ROS     Musculoskeletal:  - neg musculoskeletal ROS     GI/Hepatic:  - neg GI/hepatic ROS     Renal/Genitourinary:  - neg Renal ROS     Endo:     (+) thyroid problem,     Psychiatric/Substance Use:  - neg psychiatric ROS     Infectious Disease:       Malignancy:       Other:            Physical Exam    Airway  airway exam normal           Respiratory Devices and Support         Dental  no notable dental history         Cardiovascular   cardiovascular exam normal          Pulmonary   pulmonary exam normal                OUTSIDE LABS:  CBC: No results found for: WBC, HGB, HCT, PLT  BMP: No results found for: NA, POTASSIUM,  CHLORIDE, CO2, BUN, CR, GLC  COAGS: No results found for: PTT, INR, FIBR  POC: No results found for: BGM, HCG, HCGS  HEPATIC: No results found for: ALBUMIN, PROTTOTAL, ALT, AST, GGT, ALKPHOS, BILITOTAL, BILIDIRECT, CATARINA  OTHER: No results found for: PH, LACT, A1C, WOLF, PHOS, MAG, LIPASE, AMYLASE, TSH, T4, T3, CRP, SED    Anesthesia Plan    ASA Status:  2   NPO Status:  NPO Appropriate    Anesthesia Type: General.     - Airway: ETT   Induction: Intravenous.   Maintenance: TIVA.        Consents    Anesthesia Plan(s) and associated risks, benefits, and realistic alternatives discussed. Questions answered and patient/representative(s) expressed understanding.     - Discussed with:  Patient         Postoperative Care    Pain management: Oral pain medications.   PONV prophylaxis: Ondansetron (or other 5HT-3), Dexamethasone or Solumedrol     Comments:         H&P reviewed: Unable to attach H&P to encounter due to EHR limitations. H&P Update: appropriate H&P reviewed, patient examined. No interval changes since H&P (within 30 days).         CHRISS HODGES CRNA

## 2021-05-13 NOTE — ANESTHESIA CARE TRANSFER NOTE
Patient: Leslye Lea    Procedure(s):  Bilateral Upper Eyelid Blepharoplasty, Bilateral Pretrichial Browlift    Diagnosis: Brow ptosis [H57.819]  Dermatochalasis of both upper eyelids [H02.831, H02.834]  Visual field defect [H53.40]  Diagnosis Additional Information: No value filed.    Anesthesia Type:   General     Note:    Oropharynx: oropharynx clear of all foreign objects and spontaneously breathing  Level of Consciousness: drowsy  Oxygen Supplementation: face mask  Level of Supplemental Oxygen (L/min / FiO2): 4  Independent Airway: airway patency satisfactory and stable  Dentition: dentition unchanged  Vital Signs Stable: post-procedure vital signs reviewed and stable  Report to RN Given: handoff report given  Patient transferred to: PACU  Comments: Uneventful transport   Report to MARISSA Coppola  Exchanging well; color natl  Pt responds appropriately to command  IV patent  Lips/teeth/dentition as preop status  Questions answered  /62  HR 84 nsr    RR 16  Sat 98% with O2 face mask    Handoff Report: Identifed the Patient, Identified the Reponsible Provider, Reviewed the pertinent medical history, Discussed the surgical course, Reviewed Intra-OP anesthesia mangement and issues during anesthesia, Set expectations for post-procedure period and Allowed opportunity for questions and acknowledgement of understanding      Vitals: (Last set prior to Anesthesia Care Transfer)  CRNA VITALS  5/13/2021 1248 - 5/13/2021 1326      5/13/2021             Resp Rate (observed):  (!) 1    Resp Rate (set):  10        Electronically Signed By: CHRISS HODGES CRNA  May 13, 2021  1:26 PM

## 2021-05-13 NOTE — BRIEF OP NOTE
[facility]    Brief Operative Note    Pre-operative diagnosis: Brow ptosis [H57.819]  Dermatochalasis of both upper eyelids [H02.831, H02.834]  Visual field defect [H53.40]  Post-operative diagnosis Same as pre-operative diagnosis    Procedure: Procedure(s):  Bilateral Upper Eyelid Blepharoplasty, Bilateral Pretrichial Browlift  Surgeon: Surgeon(s) and Role:     * Sammie Faria MD - Primary  Anesthesia: General   Estimated blood loss: Less than 50 ml  Drains: None  Specimens: * No specimens in log *  Findings:   None.  Complications: None.  Implants: * No implants in log *      Mannie Silvestre MD  ENT resident

## 2021-05-13 NOTE — DISCHARGE INSTRUCTIONS
"[facility]  Home Care Following Anesthesia  For 24 hours after surgery:  1. Get plenty of rest.  A responsible adult must stay with you for at least 24 hours after you leave the surgery center.  2. Do not drive or use heavy equipment.  If you have weakness or tingling, don't drive or use heavy equipment until this feeling goes away.   3. Do not drink alcohol.   4. Avoid strenuous or risky activities.  Ask for help when climbing stairs.  5. You may feel lightheaded.  IF so, sit for a few minutes before standing.  Have someone help you get up.   6. If you have nausea (feel sick to your stomach): Drink only clear liquids such as apple juice, ginger ale, broth or 7-Up.  Rest may also help.  Be sure to drink enough fluids.  Move to a regular diet as you feel able.   7. You may have a slight fever.  Call the doctor if your fever is over 100 F (37.7 C) (taken under the tongue) or lasts longer than 24 hours.  8. You may have a dry mouth, a sore throat, muscle aches or trouble sleeping. These should go away after 24 hours.  9. Do not make important or legal decisions.   10. It is recommended to avoid smoking.        Today you received a Marcaine or bupivacaine block to numb the nerves near your surgery site.  This is a block using local anesthetic or \"numbing\" medication injected around the nerves to anesthetize or \"numb\" the area supplied by those nerves.  This block is injected into the muscle layer near your surgical site.  The medication may numb the location where you had surgery for 6-18 hours, but may last up to 24 hours.  If your surgical site is an arm or leg you should be careful with your affected limb, since it is possible to injure your limb without being aware of it due to the numbing.  Until full feeling returns, you should guard against bumping or hitting your limb, and avoid extreme hot or cold temperatures on the skin.  As the block wears off, the feeling will return as " a tingling or prickly sensation near your surgical site.  You will experience more discomfort from your incision as the feeling returns.  You may want to take a pain pill (a narcotic or Tylenol if this was prescribed by your surgeon) when you start to experience mild pain before the pain beccomes more severe.  If your pain medications do not control your pain you should notifiy your surgeon.    Tips for taking pain medications  To get the best pain relief possible, remember these points:    Take pain medications as directed, before pain becomes severe.    Pain medication can upset your stomach: taking it with food may help.    Constipation is a common side effect of pain medication. Drink plenty of  fluids.    Eat foods high in fiber. Take a stool softener if recommended by your doctor or pharmacist.    Do not drink alcohol, drive or operate machinery while taking pain medications.    Ask about other ways to control pain, such as with heat, ice or relaxation.    Tylenol/Acetaminophen Consumption  To help encourage the safe use of acetaminophen, the makers of TYLENOL  have lowered the maximum daily dose for single-ingredient Extra Strength TYLENOL  (acetaminophen) products sold in the [place] from 8 pills per day (4,000 mg) to 6 pills per day (3,000 mg). The dosing interval has also changed from 2 pills every 4-6 hours to 2 pills every 6 hours.    If you feel your pain relief is insufficient, you may take Tylenol/Acetaminophen in addition to your narcotic pain medication.     Be careful not to exceed 3,000 mg of Tylenol/Acetaminophen in a 24 hour period from all sources.    If you are taking extra strength Tylenol/acetaminophen (500 mg), the maximum dose is 6 tablets in 24 hours.    If you are taking regular strength acetaminophen (325 mg), the maximum dose is 9 tablets in 24 hours.            You received 975 mg of tylenol at 9:25 am, next dose due at 3:25 pm.     Call a doctor for any of the followin. Signs of  infection (fever, growing tenderness at the surgery site, a large amount of drainage or bleeding, severe pain, foul-smelling drainage, redness, swelling).  2. It has been over 8 to 10 hours since surgery and you are still not able to urinate (pass water).  3. Headache for over 24 hours.  4. Numbness, tingling or weakness the day after surgery (if you had spinal anesthesia).  5. Signs of Covid-19 infection (temperature over 100 degrees, shortness of breath, cough, loss of taste/smell, generalized body aches, persistent headache, chills, sore throat, nausea/vomiting/diarrhea)  Your doctor is:  Dr. Sammie Faria, Otolaryngology: [phone]  Or dial [phone] and ask for the resident on call for:  ENT Otolaryngology  For emergency care, call the:  [place] [facility]:  [phone] (TTY for hearing impaired: [phone])

## 2021-05-13 NOTE — ANESTHESIA POSTPROCEDURE EVALUATION
Patient: Leslye Lea    Procedure(s):  Bilateral Upper Eyelid Blepharoplasty, Bilateral Pretrichial Browlift    Diagnosis:Brow ptosis [H57.819]  Dermatochalasis of both upper eyelids [H02.831, H02.834]  Visual field defect [H53.40]  Diagnosis Additional Information: No value filed.    Anesthesia Type:  General    Note:  Disposition: Outpatient   Postop Pain Control: Uneventful            Sign Out: Well controlled pain   PONV: No   Neuro/Psych: Uneventful            Sign Out: Acceptable/Baseline neuro status   Airway/Respiratory: Uneventful            Sign Out: Acceptable/Baseline resp. status   CV/Hemodynamics: Uneventful            Sign Out: Acceptable CV status; No obvious hypovolemia; No obvious fluid overload   Other NRE: NONE   DID A NON-ROUTINE EVENT OCCUR?            Last vitals:  Vitals:    05/13/21 1345 05/13/21 1400 05/13/21 1409   BP: 105/69 107/61 109/72   Pulse: 85 75    Resp: 12 12 12   Temp: 37.2  C (98.9  F) 37.1  C (98.7  F) 36.8  C (98.3  F)   SpO2: 96% 96% 94%       Last vitals prior to Anesthesia Care Transfer:  CRNA VITALS  5/13/2021 1248 - 5/13/2021 1348      5/13/2021             Resp Rate (observed):  16          Electronically Signed By: Rosas Panda MD  May 13, 2021  2:21 PM

## 2021-05-28 ENCOUNTER — OFFICE VISIT (OUTPATIENT)
Dept: PLASTIC SURGERY | Facility: CLINIC | Age: 71
End: 2021-05-28
Payer: MEDICARE

## 2021-05-28 DIAGNOSIS — Z98.890 POSTOPERATIVE STATE: Primary | ICD-10-CM

## 2021-05-28 NOTE — LETTER
June 7, 2021  Re: Leslye Lea  1950      Thank you so much for referring Leslye Lea to the [facility]. I had the pleasure of visiting with Leslye today.     Attached you will find a copy of my note. Please feel free to reach out to me with any questions, [phone].     Facial Plastic and Reconstructive Surgery      Leslye Lea comes in for post op check.  She is very happy with her results and notes a significant improvement in vision.     Her scar looks good  I will follow her in a month      Your trust in our practice and care is much appreciated.    Sincerely,  Sammie Faria MD

## 2021-05-28 NOTE — LETTER
5/28/2021       RE: Leslye Lea  [address] [facility] [address]     Dear Colleague,    Thank you for referring your patient, Leslye Lae, to the [facility]. Please see a copy of my visit note below.    Facial Plastic and Reconstructive Surgery      Leslye Lea comes in for post op check.  She is very happy with her results and notes a significant improvement in vision.     Her scar looks good  I will follow her in a month        Again, thank you for allowing me to participate in the care of your patient.      Sincerely,    Sammie Faria MD

## 2021-06-07 NOTE — PROGRESS NOTES
Facial Plastic and Reconstructive Surgery      Leslye Lea comes in for post op check.  She is very happy with her results and notes a significant improvement in vision.     Her scar looks good  I will follow her in a month

## 2021-07-02 ENCOUNTER — OFFICE VISIT (OUTPATIENT)
Dept: PLASTIC SURGERY | Facility: CLINIC | Age: 71
End: 2021-07-02
Payer: MEDICARE

## 2021-07-02 DIAGNOSIS — Z98.890 POSTOPERATIVE STATE: Primary | ICD-10-CM

## 2021-07-02 NOTE — LETTER
July 2, 2021  Re: Leslye Lea  1950    Dear Dr. Hook,    Thank you so much for referring Leslye Lea to the [facility]. I had the pleasure of visiting with Leslye today.     Attached you will find a copy of my note. Please feel free to reach out to me with any questions, [phone].     Facial Plastic and Reconstructive Surgery      Leslye Lea is doing very well  She is very pleased with her outcome.   She is healing very well.  She will come back to see me on an as needed basis.             Your trust in our practice and care is much appreciated.    Sincerely,  Sammie Faria MD

## 2021-07-02 NOTE — LETTER
7/2/2021       RE: Leslye Lea  [address] [facility] [address]     Dear Colleague,    Thank you for referring your patient, Leslye Lea, [facility]. Please see a copy of my visit note below.    Facial Plastic and Reconstructive Surgery      Leslye Lea is doing very well  She is very pleased with her outcome.   She is healing very well.  She will come back to see me on an as needed basis.           Again, thank you for allowing me to participate in the care of your patient.      Sincerely,    Sammie Faria MD

## 2021-07-02 NOTE — PROGRESS NOTES
Facial Plastic and Reconstructive Surgery      Leslye GARNER Venu is doing very well  She is very pleased with her outcome.   She is healing very well.  She will come back to see me on an as needed basis.

## 2021-07-08 NOTE — NURSING NOTE
Updated photodocumentation obtained.    Pt will follow up with Dr. Bienvenido cruz.    Stephanie Alcaraz RN  7/8/2021 3:05 PM

## 2021-10-10 ENCOUNTER — HEALTH MAINTENANCE LETTER (OUTPATIENT)
Age: 71
End: 2021-10-10

## 2022-05-21 ENCOUNTER — HEALTH MAINTENANCE LETTER (OUTPATIENT)
Age: 72
End: 2022-05-21

## 2022-08-05 LAB
CHOLESTEROL (EXTERNAL): 167 MG/DL (ref 0–199)
HDLC SERPL-MCNC: 60 MG/DL
LDL CHOLESTEROL CALCULATED (EXTERNAL): 87 MG/DL
NON HDL CHOLESTEROL (EXTERNAL): 107 MG/DL
TRIGLYCERIDES (EXTERNAL): 99 MG/DL

## 2022-09-17 ENCOUNTER — HEALTH MAINTENANCE LETTER (OUTPATIENT)
Age: 72
End: 2022-09-17

## 2022-12-21 ENCOUNTER — LAB REQUISITION (OUTPATIENT)
Dept: LAB | Facility: CLINIC | Age: 72
End: 2022-12-21
Payer: MEDICARE

## 2022-12-21 DIAGNOSIS — R22.41 LOCALIZED SWELLING, MASS AND LUMP, RIGHT LOWER LIMB: ICD-10-CM

## 2022-12-21 PROCEDURE — 88304 TISSUE EXAM BY PATHOLOGIST: CPT | Mod: TC,ORL | Performed by: ORTHOPAEDIC SURGERY

## 2022-12-23 LAB
PATH REPORT.COMMENTS IMP SPEC: NORMAL
PATH REPORT.COMMENTS IMP SPEC: NORMAL
PATH REPORT.FINAL DX SPEC: NORMAL
PATH REPORT.GROSS SPEC: NORMAL
PATH REPORT.MICROSCOPIC SPEC OTHER STN: NORMAL
PATH REPORT.RELEVANT HX SPEC: NORMAL
PHOTO IMAGE: NORMAL

## 2022-12-23 PROCEDURE — 88304 TISSUE EXAM BY PATHOLOGIST: CPT | Mod: 26 | Performed by: PATHOLOGY

## 2023-07-07 ENCOUNTER — MEDICAL CORRESPONDENCE (OUTPATIENT)
Dept: HEALTH INFORMATION MANAGEMENT | Facility: CLINIC | Age: 73
End: 2023-07-07
Payer: MEDICARE

## 2023-07-30 ENCOUNTER — HEALTH MAINTENANCE LETTER (OUTPATIENT)
Age: 73
End: 2023-07-30

## 2023-09-04 RX ORDER — CLINDAMYCIN PHOSPHATE 900 MG/50ML
900 INJECTION, SOLUTION INTRAVENOUS SEE ADMIN INSTRUCTIONS
Status: CANCELLED | OUTPATIENT
Start: 2023-09-04

## 2023-09-04 RX ORDER — CLINDAMYCIN PHOSPHATE 900 MG/50ML
900 INJECTION, SOLUTION INTRAVENOUS
Status: CANCELLED | OUTPATIENT
Start: 2023-09-04

## 2023-09-13 LAB
GLUCOSE (EXTERNAL): 117 MG/DL (ref 70–100)
POTASSIUM (EXTERNAL): 4.3 MMOL/L (ref 3.5–5.1)

## 2023-10-08 ENCOUNTER — HEALTH MAINTENANCE LETTER (OUTPATIENT)
Age: 73
End: 2023-10-08

## 2023-10-17 ENCOUNTER — TRANSFERRED RECORDS (OUTPATIENT)
Dept: MULTI SPECIALTY CLINIC | Facility: CLINIC | Age: 73
End: 2023-10-17

## 2023-10-17 LAB
CREATININE (EXTERNAL): 1.2 MG/DL (ref 0.6–1)
GFR ESTIMATED (EXTERNAL): 49 ML/MIN/1.73M2

## 2023-11-06 RX ORDER — SODIUM PHOSPHATE,MONO-DIBASIC 19G-7G/118
750 ENEMA (ML) RECTAL DAILY
COMMUNITY

## 2023-11-06 RX ORDER — CHOLECALCIFEROL (VITAMIN D3) 50 MCG
2000 TABLET ORAL DAILY
COMMUNITY

## 2023-11-06 RX ORDER — ATORVASTATIN CALCIUM 20 MG/1
20 TABLET, FILM COATED ORAL AT BEDTIME
COMMUNITY
Start: 2023-09-13

## 2023-11-06 RX ORDER — CELECOXIB 200 MG/1
1 CAPSULE ORAL DAILY
Status: ON HOLD | COMMUNITY
Start: 2023-09-13 | End: 2023-11-10

## 2023-11-06 RX ORDER — GABAPENTIN 300 MG/1
1 CAPSULE ORAL 2 TIMES DAILY
Status: ON HOLD | COMMUNITY
Start: 2023-04-10 | End: 2023-11-08

## 2023-11-08 ENCOUNTER — APPOINTMENT (OUTPATIENT)
Dept: PHYSICAL THERAPY | Facility: CLINIC | Age: 73
DRG: 460 | End: 2023-11-08
Attending: ORTHOPAEDIC SURGERY
Payer: MEDICARE

## 2023-11-08 ENCOUNTER — ANESTHESIA (OUTPATIENT)
Dept: SURGERY | Facility: CLINIC | Age: 73
DRG: 460 | End: 2023-11-08
Payer: MEDICARE

## 2023-11-08 ENCOUNTER — ANESTHESIA EVENT (OUTPATIENT)
Dept: SURGERY | Facility: CLINIC | Age: 73
DRG: 460 | End: 2023-11-08
Payer: MEDICARE

## 2023-11-08 ENCOUNTER — HOSPITAL ENCOUNTER (INPATIENT)
Facility: CLINIC | Age: 73
LOS: 2 days | Discharge: HOME | DRG: 460 | End: 2023-11-10
Attending: ORTHOPAEDIC SURGERY | Admitting: SURGERY
Payer: MEDICARE

## 2023-11-08 ENCOUNTER — APPOINTMENT (OUTPATIENT)
Dept: RADIOLOGY | Facility: CLINIC | Age: 73
DRG: 460 | End: 2023-11-08
Attending: ORTHOPAEDIC SURGERY
Payer: MEDICARE

## 2023-11-08 DIAGNOSIS — M48.061 LUMBAR FORAMINAL STENOSIS: Primary | ICD-10-CM

## 2023-11-08 LAB — GLUCOSE BLDC GLUCOMTR-MCNC: 89 MG/DL (ref 70–99)

## 2023-11-08 PROCEDURE — 258N000003 HC RX IP 258 OP 636: Performed by: PHYSICIAN ASSISTANT

## 2023-11-08 PROCEDURE — 360N000085 HC SURGERY LEVEL 5 W/ FLUORO, PER MIN: Performed by: ORTHOPAEDIC SURGERY

## 2023-11-08 PROCEDURE — 250N000009 HC RX 250: Performed by: ORTHOPAEDIC SURGERY

## 2023-11-08 PROCEDURE — 272N000004 HC RX 272: Performed by: ORTHOPAEDIC SURGERY

## 2023-11-08 PROCEDURE — L8699 PROSTHETIC IMPLANT NOS: HCPCS | Performed by: ORTHOPAEDIC SURGERY

## 2023-11-08 PROCEDURE — 258N000003 HC RX IP 258 OP 636: Performed by: STUDENT IN AN ORGANIZED HEALTH CARE EDUCATION/TRAINING PROGRAM

## 2023-11-08 PROCEDURE — 250N000009 HC RX 250: Performed by: STUDENT IN AN ORGANIZED HEALTH CARE EDUCATION/TRAINING PROGRAM

## 2023-11-08 PROCEDURE — 250N000013 HC RX MED GY IP 250 OP 250 PS 637: Performed by: STUDENT IN AN ORGANIZED HEALTH CARE EDUCATION/TRAINING PROGRAM

## 2023-11-08 PROCEDURE — 272N000001 HC OR GENERAL SUPPLY STERILE: Performed by: ORTHOPAEDIC SURGERY

## 2023-11-08 PROCEDURE — 250N000011 HC RX IP 250 OP 636: Performed by: ORTHOPAEDIC SURGERY

## 2023-11-08 PROCEDURE — 250N000025 HC SEVOFLURANE, PER MIN: Performed by: ORTHOPAEDIC SURGERY

## 2023-11-08 PROCEDURE — 250N000011 HC RX IP 250 OP 636: Performed by: PHYSICIAN ASSISTANT

## 2023-11-08 PROCEDURE — 250N000013 HC RX MED GY IP 250 OP 250 PS 637: Performed by: PHYSICIAN ASSISTANT

## 2023-11-08 PROCEDURE — 250N000013 HC RX MED GY IP 250 OP 250 PS 637: Performed by: HOSPITALIST

## 2023-11-08 PROCEDURE — 97116 GAIT TRAINING THERAPY: CPT | Mod: GP

## 2023-11-08 PROCEDURE — 250N000011 HC RX IP 250 OP 636: Mod: JZ | Performed by: ANESTHESIOLOGY

## 2023-11-08 PROCEDURE — 999N000182 XR SURGERY CARM FLUORO GREATER THAN 5 MIN: Mod: TC

## 2023-11-08 PROCEDURE — 250N000011 HC RX IP 250 OP 636: Performed by: STUDENT IN AN ORGANIZED HEALTH CARE EDUCATION/TRAINING PROGRAM

## 2023-11-08 PROCEDURE — 250N000009 HC RX 250: Performed by: NURSE ANESTHETIST, CERTIFIED REGISTERED

## 2023-11-08 PROCEDURE — 120N000001 HC R&B MED SURG/OB

## 2023-11-08 PROCEDURE — 258N000003 HC RX IP 258 OP 636: Performed by: NURSE ANESTHETIST, CERTIFIED REGISTERED

## 2023-11-08 PROCEDURE — 710N000010 HC RECOVERY PHASE 1, LEVEL 2, PER MIN: Performed by: ORTHOPAEDIC SURGERY

## 2023-11-08 PROCEDURE — C1713 ANCHOR/SCREW BN/BN,TIS/BN: HCPCS | Performed by: ORTHOPAEDIC SURGERY

## 2023-11-08 PROCEDURE — 0SB40ZZ EXCISION OF LUMBOSACRAL DISC, OPEN APPROACH: ICD-10-PCS | Performed by: ORTHOPAEDIC SURGERY

## 2023-11-08 PROCEDURE — 370N000017 HC ANESTHESIA TECHNICAL FEE, PER MIN: Performed by: ORTHOPAEDIC SURGERY

## 2023-11-08 PROCEDURE — C1762 CONN TISS, HUMAN(INC FASCIA): HCPCS | Performed by: ORTHOPAEDIC SURGERY

## 2023-11-08 PROCEDURE — 250N000013 HC RX MED GY IP 250 OP 250 PS 637: Performed by: ORTHOPAEDIC SURGERY

## 2023-11-08 PROCEDURE — 999N000063 XR ABDOMEN PORT 1 VIEW

## 2023-11-08 PROCEDURE — 0SG30A0 FUSION OF LUMBOSACRAL JOINT WITH INTERBODY FUSION DEVICE, ANTERIOR APPROACH, ANTERIOR COLUMN, OPEN APPROACH: ICD-10-PCS | Performed by: ORTHOPAEDIC SURGERY

## 2023-11-08 PROCEDURE — 250N000011 HC RX IP 250 OP 636: Performed by: NURSE ANESTHETIST, CERTIFIED REGISTERED

## 2023-11-08 PROCEDURE — 999N000141 HC STATISTIC PRE-PROCEDURE NURSING ASSESSMENT: Performed by: ORTHOPAEDIC SURGERY

## 2023-11-08 PROCEDURE — 97161 PT EVAL LOW COMPLEX 20 MIN: CPT | Mod: GP

## 2023-11-08 PROCEDURE — 258N000003 HC RX IP 258 OP 636: Performed by: ORTHOPAEDIC SURGERY

## 2023-11-08 PROCEDURE — 99221 1ST HOSP IP/OBS SF/LOW 40: CPT | Mod: GC | Performed by: HOSPITALIST

## 2023-11-08 DEVICE — GRAFT BONE CHIPS CANC CUBE 15CC 100315: Type: IMPLANTABLE DEVICE | Site: ABDOMEN | Status: FUNCTIONAL

## 2023-11-08 DEVICE — IMPLANTABLE DEVICE: Type: IMPLANTABLE DEVICE | Site: ABDOMEN | Status: FUNCTIONAL

## 2023-11-08 DEVICE — ASSEMBLY 7967812 S 32X23 12MM 8DEG CP
Type: IMPLANTABLE DEVICE | Site: ABDOMEN | Status: FUNCTIONAL
Brand: SOVEREIGN™ SPINAL SYSTEM

## 2023-11-08 DEVICE — GRAFT BONE INFUSE BMP SM 7510200: Type: IMPLANTABLE DEVICE | Site: ABDOMEN | Status: FUNCTIONAL

## 2023-11-08 RX ORDER — ATORVASTATIN CALCIUM 10 MG/1
20 TABLET, FILM COATED ORAL AT BEDTIME
Status: DISCONTINUED | OUTPATIENT
Start: 2023-11-08 | End: 2023-11-10 | Stop reason: HOSPADM

## 2023-11-08 RX ORDER — LIDOCAINE 40 MG/G
CREAM TOPICAL
Status: DISCONTINUED | OUTPATIENT
Start: 2023-11-08 | End: 2023-11-08 | Stop reason: HOSPADM

## 2023-11-08 RX ORDER — PROPOFOL 10 MG/ML
INJECTION, EMULSION INTRAVENOUS PRN
Status: DISCONTINUED | OUTPATIENT
Start: 2023-11-08 | End: 2023-11-08

## 2023-11-08 RX ORDER — NALOXONE HYDROCHLORIDE 0.4 MG/ML
0.4 INJECTION, SOLUTION INTRAMUSCULAR; INTRAVENOUS; SUBCUTANEOUS
Status: DISCONTINUED | OUTPATIENT
Start: 2023-11-08 | End: 2023-11-10 | Stop reason: HOSPADM

## 2023-11-08 RX ORDER — ACETAMINOPHEN 325 MG/1
650 TABLET ORAL EVERY 4 HOURS PRN
Status: DISCONTINUED | OUTPATIENT
Start: 2023-11-11 | End: 2023-11-10 | Stop reason: HOSPADM

## 2023-11-08 RX ORDER — SODIUM CHLORIDE 9 MG/ML
INJECTION, SOLUTION INTRAVENOUS CONTINUOUS
Status: DISCONTINUED | OUTPATIENT
Start: 2023-11-08 | End: 2023-11-10 | Stop reason: HOSPADM

## 2023-11-08 RX ORDER — DIPHENHYDRAMINE HCL 12.5 MG/5ML
12.5 SOLUTION ORAL EVERY 6 HOURS PRN
Status: DISCONTINUED | OUTPATIENT
Start: 2023-11-08 | End: 2023-11-10 | Stop reason: HOSPADM

## 2023-11-08 RX ORDER — OXYCODONE HYDROCHLORIDE 5 MG/1
5 TABLET ORAL EVERY 4 HOURS PRN
Status: DISCONTINUED | OUTPATIENT
Start: 2023-11-08 | End: 2023-11-10 | Stop reason: HOSPADM

## 2023-11-08 RX ORDER — MULTIVITAMIN,THERAPEUTIC
1 TABLET ORAL DAILY
Status: DISCONTINUED | OUTPATIENT
Start: 2023-11-09 | End: 2023-11-10 | Stop reason: HOSPADM

## 2023-11-08 RX ORDER — HYDROMORPHONE HCL IN WATER/PF 6 MG/30 ML
0.2 PATIENT CONTROLLED ANALGESIA SYRINGE INTRAVENOUS
Status: DISCONTINUED | OUTPATIENT
Start: 2023-11-08 | End: 2023-11-10 | Stop reason: HOSPADM

## 2023-11-08 RX ORDER — POLYETHYLENE GLYCOL 3350 17 G/17G
17 POWDER, FOR SOLUTION ORAL DAILY
Status: DISCONTINUED | OUTPATIENT
Start: 2023-11-09 | End: 2023-11-10 | Stop reason: HOSPADM

## 2023-11-08 RX ORDER — OXYCODONE HYDROCHLORIDE 5 MG/1
10 TABLET ORAL EVERY 4 HOURS PRN
Status: DISCONTINUED | OUTPATIENT
Start: 2023-11-08 | End: 2023-11-10 | Stop reason: HOSPADM

## 2023-11-08 RX ORDER — ACETAMINOPHEN 325 MG/1
975 TABLET ORAL EVERY 8 HOURS
Qty: 27 TABLET | Refills: 0 | Status: DISCONTINUED | OUTPATIENT
Start: 2023-11-08 | End: 2023-11-10 | Stop reason: HOSPADM

## 2023-11-08 RX ORDER — LEVOTHYROXINE SODIUM 125 UG/1
125 TABLET ORAL DAILY
Status: DISCONTINUED | OUTPATIENT
Start: 2023-11-09 | End: 2023-11-10 | Stop reason: HOSPADM

## 2023-11-08 RX ORDER — HYDROMORPHONE HCL IN WATER/PF 6 MG/30 ML
0.2 PATIENT CONTROLLED ANALGESIA SYRINGE INTRAVENOUS EVERY 5 MIN PRN
Status: DISCONTINUED | OUTPATIENT
Start: 2023-11-08 | End: 2023-11-08 | Stop reason: HOSPADM

## 2023-11-08 RX ORDER — ESTRADIOL 0.5 MG/1
0.5 TABLET ORAL DAILY
Status: DISCONTINUED | OUTPATIENT
Start: 2023-11-09 | End: 2023-11-10 | Stop reason: HOSPADM

## 2023-11-08 RX ORDER — MONTELUKAST SODIUM 10 MG/1
10 TABLET ORAL AT BEDTIME
Status: DISCONTINUED | OUTPATIENT
Start: 2023-11-08 | End: 2023-11-10 | Stop reason: HOSPADM

## 2023-11-08 RX ORDER — DEXAMETHASONE SODIUM PHOSPHATE 10 MG/ML
INJECTION, SOLUTION INTRAMUSCULAR; INTRAVENOUS PRN
Status: DISCONTINUED | OUTPATIENT
Start: 2023-11-08 | End: 2023-11-08

## 2023-11-08 RX ORDER — LORATADINE 10 MG/1
10 TABLET ORAL DAILY PRN
Status: DISCONTINUED | OUTPATIENT
Start: 2023-11-08 | End: 2023-11-10 | Stop reason: HOSPADM

## 2023-11-08 RX ORDER — BISACODYL 10 MG
10 SUPPOSITORY, RECTAL RECTAL DAILY PRN
Status: DISCONTINUED | OUTPATIENT
Start: 2023-11-08 | End: 2023-11-10 | Stop reason: HOSPADM

## 2023-11-08 RX ORDER — PROPOFOL 10 MG/ML
INJECTION, EMULSION INTRAVENOUS CONTINUOUS PRN
Status: DISCONTINUED | OUTPATIENT
Start: 2023-11-08 | End: 2023-11-08

## 2023-11-08 RX ORDER — CEFAZOLIN SODIUM/WATER 2 G/20 ML
2 SYRINGE (ML) INTRAVENOUS
Status: COMPLETED | OUTPATIENT
Start: 2023-11-08 | End: 2023-11-08

## 2023-11-08 RX ORDER — PROCHLORPERAZINE MALEATE 5 MG
5 TABLET ORAL EVERY 6 HOURS PRN
Status: DISCONTINUED | OUTPATIENT
Start: 2023-11-08 | End: 2023-11-10 | Stop reason: HOSPADM

## 2023-11-08 RX ORDER — GABAPENTIN 100 MG/1
100 CAPSULE ORAL
Status: COMPLETED | OUTPATIENT
Start: 2023-11-08 | End: 2023-11-08

## 2023-11-08 RX ORDER — NALOXONE HYDROCHLORIDE 0.4 MG/ML
0.2 INJECTION, SOLUTION INTRAMUSCULAR; INTRAVENOUS; SUBCUTANEOUS
Status: DISCONTINUED | OUTPATIENT
Start: 2023-11-08 | End: 2023-11-10 | Stop reason: HOSPADM

## 2023-11-08 RX ORDER — FENTANYL CITRATE 50 UG/ML
25 INJECTION, SOLUTION INTRAMUSCULAR; INTRAVENOUS EVERY 5 MIN PRN
Status: DISCONTINUED | OUTPATIENT
Start: 2023-11-08 | End: 2023-11-08 | Stop reason: HOSPADM

## 2023-11-08 RX ORDER — TEMAZEPAM 15 MG/1
15 CAPSULE ORAL
Status: DISCONTINUED | OUTPATIENT
Start: 2023-11-08 | End: 2023-11-10 | Stop reason: HOSPADM

## 2023-11-08 RX ORDER — ONDANSETRON 4 MG/1
4 TABLET, ORALLY DISINTEGRATING ORAL EVERY 6 HOURS PRN
Status: DISCONTINUED | OUTPATIENT
Start: 2023-11-08 | End: 2023-11-10 | Stop reason: HOSPADM

## 2023-11-08 RX ORDER — CEFAZOLIN SODIUM 2 G/100ML
2 INJECTION, SOLUTION INTRAVENOUS EVERY 8 HOURS
Qty: 200 ML | Refills: 0 | Status: COMPLETED | OUTPATIENT
Start: 2023-11-08 | End: 2023-11-08

## 2023-11-08 RX ORDER — FENTANYL CITRATE 50 UG/ML
50 INJECTION, SOLUTION INTRAMUSCULAR; INTRAVENOUS EVERY 5 MIN PRN
Status: DISCONTINUED | OUTPATIENT
Start: 2023-11-08 | End: 2023-11-08 | Stop reason: HOSPADM

## 2023-11-08 RX ORDER — ACETAMINOPHEN 325 MG/1
975 TABLET ORAL ONCE
Status: COMPLETED | OUTPATIENT
Start: 2023-11-08 | End: 2023-11-08

## 2023-11-08 RX ORDER — ONDANSETRON 2 MG/ML
4 INJECTION INTRAMUSCULAR; INTRAVENOUS EVERY 6 HOURS PRN
Status: DISCONTINUED | OUTPATIENT
Start: 2023-11-08 | End: 2023-11-10 | Stop reason: HOSPADM

## 2023-11-08 RX ORDER — CEFAZOLIN SODIUM/WATER 2 G/20 ML
2 SYRINGE (ML) INTRAVENOUS SEE ADMIN INSTRUCTIONS
Status: DISCONTINUED | OUTPATIENT
Start: 2023-11-08 | End: 2023-11-08 | Stop reason: HOSPADM

## 2023-11-08 RX ORDER — VANCOMYCIN HYDROCHLORIDE 1 G/20ML
INJECTION, POWDER, LYOPHILIZED, FOR SOLUTION INTRAVENOUS
Status: DISCONTINUED
Start: 2023-11-08 | End: 2023-11-08 | Stop reason: HOSPADM

## 2023-11-08 RX ORDER — HYDROMORPHONE HCL IN WATER/PF 6 MG/30 ML
0.4 PATIENT CONTROLLED ANALGESIA SYRINGE INTRAVENOUS EVERY 5 MIN PRN
Status: DISCONTINUED | OUTPATIENT
Start: 2023-11-08 | End: 2023-11-08 | Stop reason: HOSPADM

## 2023-11-08 RX ORDER — GLYCOPYRROLATE 0.2 MG/ML
INJECTION, SOLUTION INTRAMUSCULAR; INTRAVENOUS PRN
Status: DISCONTINUED | OUTPATIENT
Start: 2023-11-08 | End: 2023-11-08

## 2023-11-08 RX ORDER — LACTOBACILLUS RHAMNOSUS GG 10B CELL
1 CAPSULE ORAL DAILY
COMMUNITY

## 2023-11-08 RX ORDER — ONDANSETRON 4 MG/1
4 TABLET, ORALLY DISINTEGRATING ORAL EVERY 30 MIN PRN
Status: DISCONTINUED | OUTPATIENT
Start: 2023-11-08 | End: 2023-11-08 | Stop reason: HOSPADM

## 2023-11-08 RX ORDER — FENTANYL CITRATE 50 UG/ML
25-100 INJECTION, SOLUTION INTRAMUSCULAR; INTRAVENOUS
Status: DISCONTINUED | OUTPATIENT
Start: 2023-11-08 | End: 2023-11-08 | Stop reason: HOSPADM

## 2023-11-08 RX ORDER — ONDANSETRON 2 MG/ML
4 INJECTION INTRAMUSCULAR; INTRAVENOUS EVERY 30 MIN PRN
Status: DISCONTINUED | OUTPATIENT
Start: 2023-11-08 | End: 2023-11-08 | Stop reason: HOSPADM

## 2023-11-08 RX ORDER — SODIUM CHLORIDE, SODIUM LACTATE, POTASSIUM CHLORIDE, CALCIUM CHLORIDE 600; 310; 30; 20 MG/100ML; MG/100ML; MG/100ML; MG/100ML
INJECTION, SOLUTION INTRAVENOUS CONTINUOUS
Status: DISCONTINUED | OUTPATIENT
Start: 2023-11-08 | End: 2023-11-08 | Stop reason: HOSPADM

## 2023-11-08 RX ORDER — HYDROMORPHONE HCL IN WATER/PF 6 MG/30 ML
0.4 PATIENT CONTROLLED ANALGESIA SYRINGE INTRAVENOUS
Status: DISCONTINUED | OUTPATIENT
Start: 2023-11-08 | End: 2023-11-10 | Stop reason: HOSPADM

## 2023-11-08 RX ORDER — AMOXICILLIN 250 MG
1 CAPSULE ORAL 2 TIMES DAILY
Status: DISCONTINUED | OUTPATIENT
Start: 2023-11-08 | End: 2023-11-10 | Stop reason: HOSPADM

## 2023-11-08 RX ADMIN — ATORVASTATIN CALCIUM 20 MG: 10 TABLET, FILM COATED ORAL at 20:14

## 2023-11-08 RX ADMIN — FENTANYL CITRATE 50 MCG: 50 INJECTION, SOLUTION INTRAMUSCULAR; INTRAVENOUS at 09:18

## 2023-11-08 RX ADMIN — ACETAMINOPHEN 975 MG: 325 TABLET ORAL at 06:19

## 2023-11-08 RX ADMIN — DEXAMETHASONE SODIUM PHOSPHATE 10 MG: 10 INJECTION, SOLUTION INTRAMUSCULAR; INTRAVENOUS at 07:31

## 2023-11-08 RX ADMIN — ROCURONIUM BROMIDE 20 MG: 10 INJECTION, SOLUTION INTRAVENOUS at 07:51

## 2023-11-08 RX ADMIN — ACETAMINOPHEN 975 MG: 325 TABLET ORAL at 22:15

## 2023-11-08 RX ADMIN — PROPOFOL 150 MG: 10 INJECTION, EMULSION INTRAVENOUS at 07:31

## 2023-11-08 RX ADMIN — PROPOFOL 25 MCG/KG/MIN: 10 INJECTION, EMULSION INTRAVENOUS at 07:46

## 2023-11-08 RX ADMIN — HYDROMORPHONE HYDROCHLORIDE 0.4 MG: 0.2 INJECTION, SOLUTION INTRAMUSCULAR; INTRAVENOUS; SUBCUTANEOUS at 13:05

## 2023-11-08 RX ADMIN — ROCURONIUM BROMIDE 50 MG: 10 INJECTION, SOLUTION INTRAVENOUS at 07:31

## 2023-11-08 RX ADMIN — Medication 2 G: at 07:26

## 2023-11-08 RX ADMIN — SODIUM CHLORIDE, POTASSIUM CHLORIDE, SODIUM LACTATE AND CALCIUM CHLORIDE: 600; 310; 30; 20 INJECTION, SOLUTION INTRAVENOUS at 06:35

## 2023-11-08 RX ADMIN — SODIUM CHLORIDE: 9 INJECTION, SOLUTION INTRAVENOUS at 10:51

## 2023-11-08 RX ADMIN — FENTANYL CITRATE 100 MCG: 50 INJECTION INTRAMUSCULAR; INTRAVENOUS at 07:31

## 2023-11-08 RX ADMIN — ACETAMINOPHEN 975 MG: 325 TABLET ORAL at 13:05

## 2023-11-08 RX ADMIN — TEMAZEPAM 15 MG: 15 CAPSULE ORAL at 20:14

## 2023-11-08 RX ADMIN — OXYCODONE HYDROCHLORIDE 5 MG: 5 TABLET ORAL at 09:50

## 2023-11-08 RX ADMIN — PHENYLEPHRINE HYDROCHLORIDE 100 MCG: 10 INJECTION INTRAVENOUS at 07:40

## 2023-11-08 RX ADMIN — SENNOSIDES AND DOCUSATE SODIUM 1 TABLET: 8.6; 5 TABLET ORAL at 20:14

## 2023-11-08 RX ADMIN — PHENYLEPHRINE HYDROCHLORIDE 50 MCG: 10 INJECTION INTRAVENOUS at 07:52

## 2023-11-08 RX ADMIN — SODIUM CHLORIDE, POTASSIUM CHLORIDE, SODIUM LACTATE AND CALCIUM CHLORIDE: 600; 310; 30; 20 INJECTION, SOLUTION INTRAVENOUS at 08:36

## 2023-11-08 RX ADMIN — LIDOCAINE HYDROCHLORIDE 5 ML: 10 INJECTION, SOLUTION EPIDURAL; INFILTRATION; INTRACAUDAL; PERINEURAL at 07:31

## 2023-11-08 RX ADMIN — PHENYLEPHRINE HYDROCHLORIDE 200 MCG: 10 INJECTION INTRAVENOUS at 07:43

## 2023-11-08 RX ADMIN — OXYCODONE HYDROCHLORIDE 10 MG: 5 TABLET ORAL at 16:09

## 2023-11-08 RX ADMIN — HYDROMORPHONE HYDROCHLORIDE 0.5 MG: 1 INJECTION, SOLUTION INTRAMUSCULAR; INTRAVENOUS; SUBCUTANEOUS at 07:54

## 2023-11-08 RX ADMIN — PROCHLORPERAZINE EDISYLATE 5 MG: 5 INJECTION INTRAMUSCULAR; INTRAVENOUS at 17:25

## 2023-11-08 RX ADMIN — ONDANSETRON 4 MG: 2 INJECTION INTRAMUSCULAR; INTRAVENOUS at 16:09

## 2023-11-08 RX ADMIN — CEFAZOLIN SODIUM 2 G: 2 INJECTION, SOLUTION INTRAVENOUS at 22:15

## 2023-11-08 RX ADMIN — HYDROMORPHONE HYDROCHLORIDE 0.4 MG: 0.2 INJECTION, SOLUTION INTRAMUSCULAR; INTRAVENOUS; SUBCUTANEOUS at 09:34

## 2023-11-08 RX ADMIN — HYDROMORPHONE HYDROCHLORIDE 0.5 MG: 1 INJECTION, SOLUTION INTRAMUSCULAR; INTRAVENOUS; SUBCUTANEOUS at 08:00

## 2023-11-08 RX ADMIN — PROPOFOL 30 MG: 10 INJECTION, EMULSION INTRAVENOUS at 08:45

## 2023-11-08 RX ADMIN — FENTANYL CITRATE 50 MCG: 50 INJECTION, SOLUTION INTRAMUSCULAR; INTRAVENOUS at 09:24

## 2023-11-08 RX ADMIN — GABAPENTIN 100 MG: 100 CAPSULE ORAL at 06:20

## 2023-11-08 RX ADMIN — CEFAZOLIN SODIUM 2 G: 2 INJECTION, SOLUTION INTRAVENOUS at 13:44

## 2023-11-08 RX ADMIN — SUGAMMADEX 200 MG: 100 INJECTION, SOLUTION INTRAVENOUS at 08:56

## 2023-11-08 RX ADMIN — GLYCOPYRROLATE 0.2 MG: 0.2 INJECTION INTRAMUSCULAR; INTRAVENOUS at 07:46

## 2023-11-08 RX ADMIN — MIDAZOLAM 2 MG: 1 INJECTION INTRAMUSCULAR; INTRAVENOUS at 07:25

## 2023-11-08 RX ADMIN — HYDROMORPHONE HYDROCHLORIDE 0.4 MG: 0.2 INJECTION, SOLUTION INTRAMUSCULAR; INTRAVENOUS; SUBCUTANEOUS at 10:46

## 2023-11-08 RX ADMIN — MONTELUKAST SODIUM 10 MG: 10 TABLET, COATED ORAL at 20:14

## 2023-11-08 RX ADMIN — SENNOSIDES AND DOCUSATE SODIUM 1 TABLET: 8.6; 5 TABLET ORAL at 12:15

## 2023-11-08 RX ADMIN — OXYCODONE HYDROCHLORIDE 10 MG: 5 TABLET ORAL at 20:13

## 2023-11-08 ASSESSMENT — ACTIVITIES OF DAILY LIVING (ADL)
ADLS_ACUITY_SCORE: 21
ADLS_ACUITY_SCORE: 20
ADLS_ACUITY_SCORE: 25
ADLS_ACUITY_SCORE: 21
ADLS_ACUITY_SCORE: 20
ADLS_ACUITY_SCORE: 20

## 2023-11-08 NOTE — ANESTHESIA POSTPROCEDURE EVALUATION
Patient: Leslye Lea    Procedure: Procedure(s):  LUMBAR 5 - SACRAL 1 ANTERIOR DISCECTOMY, FUSION, PLATING  SURGICAL EXPOSURE, ANTERIOR APPROACH, WITH WOUND CLOSURE, FOR LUMBAR SPINE SURGERY, BY GENERAL SURGERY       Anesthesia Type:  General    Note:  Disposition: Inpatient   Postop Pain Control: Uneventful            Sign Out: Well controlled pain   PONV: No   Neuro/Psych: Uneventful            Sign Out: Acceptable/Baseline neuro status   Airway/Respiratory: Uneventful            Sign Out: Acceptable/Baseline resp. status   CV/Hemodynamics: Uneventful            Sign Out: Acceptable CV status; No obvious hypovolemia; No obvious fluid overload   Other NRE:    DID A NON-ROUTINE EVENT OCCUR?            Last vitals:  Vitals Value Taken Time   /59 11/08/23 0950   Temp 36.7  C (98.1  F) 11/08/23 1000   Pulse 68 11/08/23 1001   Resp 20 11/08/23 1000   SpO2 99 % 11/08/23 1001       Electronically Signed By: Jhonatan Kimbrough MD  November 8, 2023  1:21 PM

## 2023-11-08 NOTE — ANESTHESIA PREPROCEDURE EVALUATION
Anesthesia Pre-Procedure Evaluation    Patient: Leslye Lea   MRN: [id number] : 1950        Procedure : Procedure(s):  LUMBAR 5 - SACRAL 1 ANTERIOR DISCECTOMY, FUSION, PLATING  SURGICAL EXPOSURE, ANTERIOR APPROACH, WITH WOUND CLOSURE, FOR LUMBAR SPINE SURGERY, BY GENERAL SURGERY          Past Medical History:   Diagnosis Date    Hypothyroid       Past Surgical History:   Procedure Laterality Date    APPENDECTOMY      BLEPHAROPLASTY, BROW LIFT, COMBINED Bilateral 2021    Procedure: Bilateral Upper Eyelid Blepharoplasty, Bilateral Pretrichial Browlift;  Surgeon: Sammie Faria MD;  Location: [facility] OR    BUNIONECTOMY Bilateral     excision of cecum      hemmorrhoidectomy      HYSTERECTOMY      lymph node biopsy      THYROIDECTOMY      US BREAST CYST ASPIRATION (GICH) Bilateral       Allergies   Allergen Reactions    Seasonal Allergies     Keflex [Cephalexin] Rash     Lip swelling, urticaria    Trazodone Other (See Comments), Muscle Pain (Myalgia) and Cough     Night sweats      Social History     Tobacco Use    Smoking status: Never    Smokeless tobacco: Never   Substance Use Topics    Alcohol use: Yes     Comment: occas      Wt Readings from Last 1 Encounters:   23 55.3 kg (122 lb)        Anesthesia Evaluation   Pt has had prior anesthetic.     No history of anesthetic complications       ROS/MED HX  ENT/Pulmonary:  - neg pulmonary ROS     Neurologic:  - neg neurologic ROS     Cardiovascular:  - neg cardiovascular ROS     METS/Exercise Tolerance:     Hematologic:  - neg hematologic  ROS     Musculoskeletal:       GI/Hepatic:  - neg GI/hepatic ROS     Renal/Genitourinary:  - neg Renal ROS     Endo:     (+)          thyroid problem,            Psychiatric/Substance Use:       Infectious Disease:  - neg infectious disease ROS     Malignancy:       Other:            Physical Exam    Airway  airway exam normal      Mallampati: I    Neck ROM: full     Respiratory Devices  "and Support         Dental           Cardiovascular   cardiovascular exam normal       Rhythm and rate: regular and normal     Pulmonary   pulmonary exam normal        breath sounds clear to auscultation           OUTSIDE LABS:  CBC: No results found for: \"WBC\", \"HGB\", \"HCT\", \"PLT\"  BMP:   Lab Results   Component Value Date    GLC 89 11/08/2023     COAGS: No results found for: \"PTT\", \"INR\", \"FIBR\"  POC: No results found for: \"BGM\", \"HCG\", \"HCGS\"  HEPATIC: No results found for: \"ALBUMIN\", \"PROTTOTAL\", \"ALT\", \"AST\", \"GGT\", \"ALKPHOS\", \"BILITOTAL\", \"BILIDIRECT\", \"CATARINA\"  OTHER: No results found for: \"PH\", \"LACT\", \"A1C\", \"WOLF\", \"PHOS\", \"MAG\", \"LIPASE\", \"AMYLASE\", \"TSH\", \"T4\", \"T3\", \"CRP\", \"SED\"    Anesthesia Plan    ASA Status:  2       Anesthesia Type: General.     - Airway: ETT   Induction: Intravenous.   Maintenance: Balanced.        Consents    Anesthesia Plan(s) and associated risks, benefits, and realistic alternatives discussed. Questions answered and patient/representative(s) expressed understanding.     - Discussed:     - Discussed with:  Patient            Postoperative Care    Pain management: IV analgesics, Peripheral nerve block (Single Shot).   PONV prophylaxis: Ondansetron (or other 5HT-3), Dexamethasone or Solumedrol     Comments:                Kareem Marion MD  "

## 2023-11-08 NOTE — PROGRESS NOTES
Patient vital signs are at baseline: no, intermittently requiring 1-1.5L oxygen while sleeping  Patient able to ambulate as they were prior to admission or with assist devices provided by therapies during their stay:  no- not yet OOB post op, encouraged activity   Patient MUST void prior to discharge:  No,  Reason:  arenas cath in place, remove POD 1.   Patient able to tolerate oral intake:  No,  Reason:  full liquids, AAT. Not passing gas yet, no nausea.   Pain has adequate pain control using Oral analgesics:  No,  Reason:  PRN PIV dilaudid x2 admin for break though pain.   Does patient have an identified :  Yes  Has goal D/C date and time been discussed with patient:  Yes  Surgical site CDI, ice applied. Calls appropriately to make needs known, alarms on for safety.

## 2023-11-08 NOTE — PROGRESS NOTES
11/08/23 1607   Appointment Info   Signing Clinician's Name / Credentials (PT) Star Juarez   Living Environment   People in Home spouse   Current Living Arrangements house   Home Accessibility stairs to enter home   Number of Stairs, Main Entrance 3   Stair Railings, Main Entrance railings safe and in good condition   Transportation Anticipated family or friend will provide   Self-Care   Usual Activity Tolerance moderate   Current Activity Tolerance moderate   Equipment Currently Used at Home none   Fall history within last six months no   Activity/Exercise/Self-Care Comment Indep with all   I ADL's other than shopping, dishes, limiited standing abilitiy due to pain   General Information   Onset of Illness/Injury or Date of Surgery 11/08/23   Pertinent History of Current Problem (include personal factors and/or comorbidities that impact the POC) Lumbar foraminal stenosis   Existing Precautions/Restrictions spinal   Cognition   Affect/Mental Status (Cognition) WNL   Follows Commands (Cognition) WNL   Posture    Posture Not impaired   Range of Motion (ROM)   Range of Motion ROM deficits secondary to surgical procedure   Strength (Manual Muscle Testing)   Strength (Manual Muscle Testing) No deficits observed during functional mobility   Bed Mobility   Bed Mobility supine-sit   Supine-Sit [place] (Bed Mobility) verbal cues;supervision   Comment, (Bed Mobility) log roll cues   Transfers   Transfers sit-stand transfer   Sit-Stand Transfer   Sit-Stand [place] (Transfers) contact guard;verbal cues   Assistive Device (Sit-Stand Transfers) walker, front-wheeled   Gait/Stairs (Locomotion)   [place] Level (Gait) contact guard;verbal cues;1 person assist   Assistive Device (Gait) walker, front-wheeled   Distance in Feet (Gait) 20   Pattern (Gait) step-to   Deviations/Abnormal Patterns (Gait) josefina decreased;gait speed decreased   Balance   Balance no deficits were identified   Sensory Examination   Sensory  Perception patient reports no sensory changes   Clinical Impression   Criteria for Skilled Therapeutic Intervention Yes, treatment indicated   PT Diagnosis (PT) impaired functional mobility   Influenced by the following impairments weakness, pain   Functional limitations due to impairments transfers, gait   Clinical Presentation (PT Evaluation Complexity) stable   Clinical Presentation Rationale Pateint presents as medically diagnosed   Clinical Decision Making (Complexity) low complexity   Planned Therapy Interventions (PT) gait training;home exercise program;patient/family education;stair training;transfer training   Risk & Benefits of therapy have been explained evaluation/treatment results reviewed;participants included;patient   PT Total Evaluation Time   PT Eval, Low Complexity Minutes (80509) 10   Physical Therapy Goals   PT Frequency Daily   PT Predicted Duration/Target Date for Goal Attainment 11/10/23   PT Goals Transfers;Gait;Stairs;PT Goal 1   PT: Transfers Modified independent;Sit to/from stand;Within precautions;Assistive device   PT: Gait Modified independent;Rolling walker;Within precautions;150 feet   PT: Stairs Supervision/stand-by assist;4 stairs;Within precautions;Rail on both sides   PT: Goal 1 indep with HEP   Interventions   Interventions Quick Adds Gait Training   Gait Training   Gait Training Minutes (63927) 12   Symptoms Noted During/After Treatment (Gait Training) none   Treatment Detail/Skilled Intervention slow stable gait, vc for use of walker/safety   Distance in Feet 250   [place] Level (Gait Training) stand-by assist   Physical Assistance Level (Gait Training) verbal cues;1 person assist   Assistive Device (Gait Training) rolling walker   Pattern Analysis (Gait Training) swing-through gait   Gait Analysis Deviations decreased josefina;decreased step length   Impairments (Gait Analysis/Training) pain   PT Discharge Planning   PT Plan progress with gait/transfers, hep   PT  Discharge Recommendation (DC Rec) home with assist   PT Rationale for DC Rec Patient mobilizng well and has good home  setup and support   PT Brief overview of current status Patient SBA for gait/transfers   PT Equipment Needed at Discharge walker, rolling   Total Session Time   Timed Code Treatment Minutes 12   Total Session Time (sum of timed and untimed services) 22

## 2023-11-08 NOTE — OP NOTE
OPERATIVE REPORT    Leslye GARNER [employer]  Medical Record #:  [id number]  YOB: 1950  Age:  72 year old    PROCEDURE DATE:  11/8/2023    PREOPERATIVE DIAGNOSIS: Degenerative disc disease decision for anterior spinal fusion L5-S1 spinal level    POSTOPERATIVE DIAGNOSIS: Same    PROCEDURE: Anterior retroperitoneal exposure 1 level spinal fusion surgery L5-S1 spinal level    OPERATING SURGEON:  Jluis Resendez MD    ASSISTANT: Dr. Dorado co-surgeon    ANESTHESIA: General    DESCRIPTION OF PROCEDURE: With the patient in supine position under general anesthesia having reviewed the risk benefits and complications of surgical intervention with her abdomen is prepped and draped in usual sterile fashion low midline incision is made left retroperitoneal space entered and visceral structures retracted above into the right side.  Median sacral vessels are divided and only blunt dissection is used until the immediate presacral space is entered.  Adequate mobilization is obtained radiographic control used identify appropriate interspace and midline.  With intermittent traction on the vasculature the spinal fusion is completed L5-S1 spinal level.  Please see separate report.  At the completion of the procedure vessel structure returned to normal anatomic position time taken to ensure hemostasis is obtained.  The wound is closed with running loop PDS in the fascia layer and skin closed with interrupted 2-0 Vicryl and running subcuticular suture.  The estimated blood loss was minimal there were no complications and the patient tolerated the procedure well.  Bunge needle counts are correct x2.    EBL:  [unfilled]    SPECIMENS:  * No specimens in log *    Jluis resendez md  [facility]

## 2023-11-08 NOTE — ANESTHESIA CARE TRANSFER NOTE
Patient: Leslye Lea    Procedure: Procedure(s):  LUMBAR 5 - SACRAL 1 ANTERIOR DISCECTOMY, FUSION, PLATING  SURGICAL EXPOSURE, ANTERIOR APPROACH, WITH WOUND CLOSURE, FOR LUMBAR SPINE SURGERY, BY GENERAL SURGERY       Diagnosis: Disc degeneration, lumbosacral [M51.37]  Lumbar stenosis [M48.061]  Diagnosis Additional Information: No value filed.    Anesthesia Type:   General     Note:    Oropharynx: spontaneously breathing and oropharynx clear of all foreign objects  Level of Consciousness: awake  Oxygen Supplementation: face mask  Level of Supplemental Oxygen (L/min / FiO2): 6  Independent Airway: airway patency satisfactory and stable  Dentition: dentition unchanged  Vital Signs Stable: post-procedure vital signs reviewed and stable  Report to RN Given: handoff report given  Patient transferred to: PACU  Comments: /68  Handoff Report: Identifed the Patient, Identified the Reponsible Provider, Reviewed the pertinent medical history, Discussed the surgical course, Reviewed Intra-OP anesthesia mangement and issues during anesthesia, Set expectations for post-procedure period and Allowed opportunity for questions and acknowledgement of understanding      Vitals:  Vitals Value Taken Time   BP     Temp     Pulse 76 11/08/23 0906   Resp 16 11/08/23 0906   SpO2 100 % 11/08/23 0906   Vitals shown include unfiled device data.    Electronically Signed By: CHRISS Mai CRNA  November 8, 2023  9:07 AM

## 2023-11-08 NOTE — PHARMACY-ADMISSION MEDICATION HISTORY
Pharmacist Admission Medication History    Admission medication history is complete. The information provided in this note is only as accurate as the sources available at the time of the update.    Information Source(s): Patient and CareEverywhere/SureScripts via in-person    Pertinent Information: Patient states she has all oral medications with her. Advised that hospital will provide medications. She states she does not need supplements or vitamins while admitted.    Allergies reviewed with patient and updates made in EHR: yes    Medication History Completed By: Lynne Toure, PharmD 11/8/2023 7:02 AM    Prior to Admission medications    Medication Sig Last Dose Taking? Auth Provider Long Term End Date   atorvastatin (LIPITOR) 20 MG tablet Take 20 mg by mouth at bedtime 11/7/2023 at hs Yes Reported, Patient Yes    Bioflavonoid Products (VITAMIN C CR) 1000-100 MG TBCR Take 1,000 mg by mouth daily 11/7/2023 Yes Reported, Patient     celecoxib (CELEBREX) 200 MG capsule Take 1 capsule by mouth daily Past Week Yes Reported, Patient Yes    estradiol (ESTRACE) 0.5 MG tablet Take 0.5 mg by mouth daily 11/7/2023 Yes Reported, Patient Yes    glucosamine-chondroitin 500-400 MG CAPS per capsule Take 750 mg by mouth daily 11/7/2023 Yes Reported, Patient     HEMP OIL OR EXTRACT OR OTHER CBD CANNABINOID, NOT MEDICAL CANNABIS, Take 1 capsule by mouth daily 11/7/2023 Yes Unknown, Entered By History     lactobacillus rhamnosus, GG, (CULTURELL) capsule Take 1 capsule by mouth daily 11/7/2023 Yes Unknown, Entered By History     levothyroxine (SYNTHROID/LEVOTHROID) 125 MCG tablet Take 125 mcg by mouth daily 11/8/2023 at am Yes Reported, Patient Yes    montelukast (SINGULAIR) 10 MG tablet Take 10 mg by mouth at bedtime 11/7/2023 at hs Yes Reported, Patient Yes    Multiple Vitamin (MULTI-VITAMINS) TABS Take 500 mg by mouth daily 11/7/2023 Yes Reported, Patient     sertraline (ZOLOFT) 100 MG tablet Take 150 mg by mouth daily 11/7/2023  at am Yes Reported, Patient Yes    temazepam (RESTORIL) 30 MG capsule Take 30 mg by mouth nightly as needed for sleep 11/7/2023 Yes Reported, Patient     Vitamin D3 50 mcg (2000 units) tablet Take 2,000 Units by mouth daily 11/7/2023 Yes Reported, Patient

## 2023-11-08 NOTE — ANESTHESIA PROCEDURE NOTES
Airway       Patient location during procedure: OR       Procedure Start/Stop Times: 11/8/2023 7:34 AM  Staff -        Other Anesthesia Staff: Agata Ramirez       Performed By: SRNA  Consent for Airway        Urgency: elective  Indications and Patient Condition       Indications for airway management: khari-procedural       Induction type:intravenous       Mask difficulty assessment: 1 - vent by mask    Final Airway Details       Final airway type: endotracheal airway       Successful airway: ETT - single  Endotracheal Airway Details        ETT size (mm): 7.0       Cuffed: yes       Cuff volume (mL): 8       Successful intubation technique: direct laryngoscopy       DL Blade Type: [place] 2       Grade View of Cords: 1       Adjucts: stylet       Position: Right       Measured from: lips       Secured at (cm): 21       Bite block used: None    Post intubation assessment        Placement verified by: capnometry, equal breath sounds and chest rise        Number of attempts at approach: 1       Number of other approaches attempted: 0       Secured with: silk tape       Ease of procedure: easy       Dentition: Intact and Unchanged       Dental guard used and removed. Dental Guard Type: Standard White.    Medication(s) Administered   Medication Administration Time: 11/8/2023 7:34 AM

## 2023-11-08 NOTE — PROGRESS NOTES
[facility] Brief Operative Note    Pre-operative diagnosis: Disc degeneration, lumbosacral [M51.37]  Lumbar stenosis [M48.061]   Post-operative diagnosis: Same   Procedure: L5-S1 anterior lumbar interbody fusion and plating   Surgeon: Ovi Dorado   Assistant(s): Abhishek Mercado PA-C   Anesthesia: General endotracheal anesthesia   Estimated blood loss: 75cc   Total IV fluids: (See anesthesia record)   Blood transfusion: No transfusion was given during surgery   Total urine output: (See anesthesia record)   Drains: None   Specimens: None   Implants: [employer]   Findings: L5-S1 disc degeneration and foraminal stenosis   Complications: None   Condition: Stable   Weight bearing status: Weight bearing as tolerated   Activity: Activity as tolerated  Patient may move about with assist as indicated or with supervision   Orthotic management: Not applicable   Comments: See dictated operative report for full details     Abhishek Mercado PA-C  [facility]

## 2023-11-08 NOTE — CONSULTS
[facility]  Consult Note - Hospitalist Service  Date of Admission:  11/8/2023  Consult Requested by: Dr. Ovi Dorado   Reason for Consult: Postop medical management    Assessment & Plan   Leslye Lea is a 72 year old female admitted on 11/8/2023. She has a past medical history significant for degenerative disc disease, hypothyroidism, hyperlipidemia, depressive disorder, insomnia, reported history of mitral valve prolapse. Spinal fusion L5-S1 with Dr. Dorado was performed 11/8/2023. Per chart review EBL 75cc.     Status post L5-S1 spinal fusion  -performed 11/8/2023 by Dr. Ovi Dorado  -pain management, DVT ppx, and IV fluids per surgery    -Depressive disorder  -reported history of serotonin syndrome    -sertraline ordered at lower dose of 50 mg daily to reduce risk of serotonin syndrome   -continue to monitor patient for signs and symptoms of     -Hypothyroidism   -continue PTA levothyroxine     -Hyperlipidemia   Continue PTA atorvastatin    -Hormone replacement therapy-postmenopausal   -status post hysterectomy/oophorectomy    -Continue PTA estrace    -discussed extensively with patient that estrogen supplementation may increase the risk DVT/PE. Patient verbalized clear understanding of potential benefits and potential adverse effects of estrogen supplementation as well as alternative treatments and verbalized wanting to continue with PTA estrogen supplementation.     -Degenerative disc disease   -PTA Celebrex not ordered    Seasonal allergies   -continue PTA montelukast    Insomnia   -Patient takes temazepam nightly for sleep. She was willing to take a lower dosage during her hospitalization. Patient verbalized clear understanding of potential benefits and potential adverse effects of temazepam especially with increased age as well as alternative treatments and verbalized wanting to continue with nightly temazepam.     Reported history of mitral valve prolapse   -patient reports  she was told that she had MVP by a physician years ago. Patient states cardiac workup was performed years ago and was reassuring. No record of this on file.   -continue to monitor clinically      The patient's care was discussed with the Attending Physician, Dr. Mohsen Jacobs MD .    Clinically Significant Risk Factors Present on Admission                                  Armando Zuñiga DO  Hospitalist Service  Securely message with [employer] (more info)  Text page via [facility] Paging/Directory   ______________________________________________________________________    Chief Complaint   Status post spinal fusion L5-S1 11/8/2023    History is obtained from the patient    History of Present Illness   She has a past medical history significant for degenerative disc disease, hypothyroidism, hyperlipidemia, depressive disorder, insomnia, reported history of mitral valve prolapse. Spinal fusion L5-S1 with Dr. Dorado was performed 11/8/2023. She reports that initially she had a significant amount of postoperative pain however, with as needed pain management she is feeling much more comfortable.  She does affirm some generalized abdominal tenderness near the incision site.  She denies headache, ear, nose or throat concerns, nausea, vomiting, shortness of breath, chest pain. She states that she has never had a blood clot in the past and has no personal history of cancer.  She does report that when she stopped taking temazepam in the past and started on another sleep medication, which she believes was trazodone that she experienced significant night sweats and was subsequently told she had serotonin syndrome.  She states that she has been on temazepam for approximately 30 years and takes this medication nightly for sleep.  She also reports that she has been on estrogen supplementation since a hysterectomy/oophorectomy.  She reports that she tried to wean off of estrogen supplementation in the past but that she had significant  postmenopausal symptoms. She reports no additional concerns at this time.      Past Medical History    Past Medical History:   Diagnosis Date    Hypothyroid        Past Surgical History   Past Surgical History:   Procedure Laterality Date    APPENDECTOMY  2001    BLEPHAROPLASTY, BROW LIFT, COMBINED Bilateral 5/13/2021    Procedure: Bilateral Upper Eyelid Blepharoplasty, Bilateral Pretrichial Browlift;  Surgeon: Sammie Faria MD;  Location: [facility] OR    BUNIONECTOMY Bilateral     excision of cecum  2001    hemmorrhoidectomy  2005    HYSTERECTOMY  2000    lymph node biopsy  2001    THYROIDECTOMY  1999    US BREAST CYST ASPIRATION (GICH) Bilateral        Medications   I have reviewed this patient's current medications  Current Facility-Administered Medications   Medication    [START ON 11/11/2023] acetaminophen (TYLENOL) tablet 650 mg    acetaminophen (TYLENOL) tablet 975 mg    atorvastatin (LIPITOR) tablet 20 mg    benzocaine-menthol (CEPACOL) 15-3.6 MG lozenge 1 lozenge    bisacodyl (DULCOLAX) suppository 10 mg    ceFAZolin (ANCEF) 2 g in 100 mL D5W intermittent infusion    diphenhydrAMINE (BENADRYL) liquid 12.5 mg    [START ON 11/9/2023] estradiol (ESTRACE) tablet 0.5 mg    heparin (porcine) 30,000 Units in sodium chloride 0.9 % 1,030 mL PERFUSION solution    HYDROmorphone (DILAUDID) injection 0.2 mg    Or    HYDROmorphone (DILAUDID) injection 0.4 mg    [START ON 11/9/2023] levothyroxine (SYNTHROID/LEVOTHROID) tablet 125 mcg    loratadine (CLARITIN) tablet 10 mg    magnesium hydroxide (MILK OF MAGNESIA) suspension 30 mL    montelukast (SINGULAIR) tablet 10 mg    [START ON 11/9/2023] multivitamin, therapeutic (THERA-VIT) tablet 1 tablet    naloxone (NARCAN) injection 0.2 mg    Or    naloxone (NARCAN) injection 0.4 mg    Or    naloxone (NARCAN) injection 0.2 mg    Or    naloxone (NARCAN) injection 0.4 mg    ondansetron (ZOFRAN ODT) ODT tab 4 mg    Or    ondansetron (ZOFRAN) injection 4 mg    oxyCODONE (ROXICODONE)  tablet 5 mg    Or    oxyCODONE (ROXICODONE) tablet 10 mg    [START ON 11/9/2023] polyethylene glycol (MIRALAX) Packet 17 g    prochlorperazine (COMPAZINE) injection 5 mg    Or    prochlorperazine (COMPAZINE) tablet 5 mg    senna-docusate (SENOKOT-S/PERICOLACE) 8.6-50 MG per tablet 1 tablet    [START ON 11/9/2023] sertraline (ZOLOFT) tablet 50 mg    sodium chloride 0.9 % infusion    temazepam (RESTORIL) capsule 15 mg          Review of Systems    The 10 point Review of Systems is negative other than noted in the HPI or here. ROS reviewed, see HPI for pertinent positives and negatives.    Social History   I have reviewed this patient's social history and updated it with pertinent information if needed.  Social History     Tobacco Use    Smoking status: Never    Smokeless tobacco: Never   Substance Use Topics    Alcohol use: Yes     Comment: occas    Drug use: Never         Family History   I have reviewed this patient's family history and updated it with pertinent information if needed.  Family History   Problem Relation Age of Onset    Breast Cancer Mother     Transient ischemic attack Mother     Alzheimer Disease Mother          Allergies   Allergies   Allergen Reactions    Seasonal Allergies     Keflex [Cephalexin] Rash     Lip swelling, urticaria    Trazodone Other (See Comments), Muscle Pain (Myalgia) and Cough     Night sweats        Physical Exam   Vital Signs: Temp: 97.6  F (36.4  C) Temp src: Oral BP: 114/56 Pulse: 70   Resp: 16 SpO2: 99 % O2 Device: Nasal cannula Oxygen Delivery: 1.5 LPM  Weight: 122 lbs 0 oz    Physical Exam  Constitutional:       General: She is not in acute distress.     Appearance: Normal appearance.   HENT:      Head: Normocephalic.      Right Ear: External ear normal.      Left Ear: External ear normal.      Nose: Nose normal.      Mouth/Throat:      Mouth: Mucous membranes are moist.   Cardiovascular:      Rate and Rhythm: Normal rate and regular rhythm.      Pulses: Normal pulses.     "  Heart sounds: No murmur heard.     No gallop.   Pulmonary:      Effort: Pulmonary effort is normal. No respiratory distress.      Breath sounds: Normal breath sounds. No wheezing or rales.   Abdominal:      Palpations: Abdomen is soft.      Tenderness: There is abdominal tenderness.      Comments: Abdominal incision clean, cry, intact.   Neurological:      Mental Status: She is alert.           Medical Decision Making     30 MINUTES SPENT BY ME on the date of service doing chart review, history, exam, documentation & further activities per the note.      Data         Imaging results reviewed over the past 24 hrs:   Recent Results (from the past 24 hour(s))   POC US Guidance Needle Placement    Narrative    Ultrasound was performed as guidance to an anesthesia procedure.  Click   \"PACS images\" hyperlink below to view any stored images.  For specific   procedure details, view procedure note authored by anesthesia.   XR Surgery [place]  Fluoro G/T 5 Min    Narrative    This exam was marked as non-reportable because it will not be read by a   radiologist or a [place] non-radiologist provider.         XR Abdomen Port 1 View    Narrative    EXAM: XR ABDOMEN PORT 1 VIEW  LOCATION: [facility]  DATE: 11/8/2023    INDICATION: intra op instrument verification  COMPARISON: None.      Impression    IMPRESSION: Surgical fusion hardware at the lumbosacral junction. No additional foreign material seen.     "

## 2023-11-08 NOTE — PROCEDURES
November 8, 2023     Attending Surgeon: Dr. Ovi Dorado     Co-surgeon:  Dr. Jluis Rangel     Assistant: Bradly Mercado PA-C.  The assistance of Bradly Mercado PA-C was critical and important in the safe performance of the discectomy, fusion, and plating.  This would not of been possible with the scrub tech.     Preoperative diagnosis:  1. L5-S1 disc degeneration and stenosis  2.  Failed nonsurgical management.     Postoperative diagnosis:  1. L5-S1 disc degeneration and stenosis  2.  Failed nonsurgical management.        Procedures performed:  1. Anterior L5-S1 discectomy and  interbody fusion utilizing a combination of 2 sponges of bone morphogenic protein and cancellous allograft bone   2.   L5-S1 interbody device placement utilizing Medtronic peek PEEK spacer size 12 mm   3.   L5-S1 anterior plating utilizing Medtronic titanium plate and using 2 screws into L5 and 2 screws into S1; this plate is separate from the interbody device.     Complications: none apparent     Details of procedure:     Introduction:   The patient is a very pleasant female with a longstanding history of low back pain and right greater than left lower extremity pain.  She had exhausted nonsurgical measures and therefore I discussed with her the option of surgical intervention and specifically discussed risks, including but not limited to death, infection, dural tear, nerve injury, and nonresolution of symptoms among others, and she accepted and wished to proceed.     The patient was brought to the operating room and placed under general orotracheal anesthetic without complications.  Intravenous antibiotics were given within 1 hour of incision.  A Harmon catheter was placed.  She was then placed supine on the operating table and lower abdominal area prepped and draped in the routine sterile manner.  After the appropriate timeout, Dr. Rangel performed an anterior retroperitoneal exposure of the L5-S1 disc and this is contained in his  separate dictation.  After intraoperative fluoroscopy confirmation of he correct level, I used a 15 blade to perform an annulotomy and utilized Hopper elevators, curettes, and pituitary rongeurs to perform a near-total discectomy.  I then was able to secure the appropriately sized PEEK spacer and fill this with 2 sponges of bone morphogenic protein and  cancellous allograft bone and I then impacted this into the disc space with a good fit.  I then was able to place a plate over L5 and S1 and placed 2 screws into the plate and into the L5 vertebral body and 2 screws into the S1 vertebral body with good purchase.  The locking tabs were rotated to prevent backing out of the screws.  Final imaging showed good placement of the implants.  We then performed copious irrigation and hemostasis. Layer by layer closure was then performed by Dr. Rangel and again this is contained in a separate dictation.  The patient was subsequently extubated without complications and brought to the recovery room in satisfactory condition     Postoperative plan: Patient to mobilize as tolerated. After discharge, she will be seen back clinic in 6 weeks.

## 2023-11-09 ENCOUNTER — APPOINTMENT (OUTPATIENT)
Dept: OCCUPATIONAL THERAPY | Facility: CLINIC | Age: 73
DRG: 460 | End: 2023-11-09
Attending: PHYSICIAN ASSISTANT
Payer: MEDICARE

## 2023-11-09 ENCOUNTER — APPOINTMENT (OUTPATIENT)
Dept: PHYSICAL THERAPY | Facility: CLINIC | Age: 73
DRG: 460 | End: 2023-11-09
Attending: ORTHOPAEDIC SURGERY
Payer: MEDICARE

## 2023-11-09 PROCEDURE — 99231 SBSQ HOSP IP/OBS SF/LOW 25: CPT | Performed by: STUDENT IN AN ORGANIZED HEALTH CARE EDUCATION/TRAINING PROGRAM

## 2023-11-09 PROCEDURE — 250N000013 HC RX MED GY IP 250 OP 250 PS 637: Performed by: HOSPITALIST

## 2023-11-09 PROCEDURE — 97535 SELF CARE MNGMENT TRAINING: CPT | Mod: GO

## 2023-11-09 PROCEDURE — 250N000011 HC RX IP 250 OP 636: Mod: JZ | Performed by: PHYSICIAN ASSISTANT

## 2023-11-09 PROCEDURE — 97110 THERAPEUTIC EXERCISES: CPT | Mod: GP

## 2023-11-09 PROCEDURE — 97166 OT EVAL MOD COMPLEX 45 MIN: CPT | Mod: GO

## 2023-11-09 PROCEDURE — 120N000001 HC R&B MED SURG/OB

## 2023-11-09 PROCEDURE — 250N000013 HC RX MED GY IP 250 OP 250 PS 637: Performed by: PHYSICIAN ASSISTANT

## 2023-11-09 PROCEDURE — 97116 GAIT TRAINING THERAPY: CPT | Mod: GP

## 2023-11-09 RX ADMIN — TEMAZEPAM 15 MG: 15 CAPSULE ORAL at 20:33

## 2023-11-09 RX ADMIN — HYDROMORPHONE HYDROCHLORIDE 0.2 MG: 0.2 INJECTION, SOLUTION INTRAMUSCULAR; INTRAVENOUS; SUBCUTANEOUS at 01:52

## 2023-11-09 RX ADMIN — MONTELUKAST SODIUM 10 MG: 10 TABLET, COATED ORAL at 20:33

## 2023-11-09 RX ADMIN — ACETAMINOPHEN 975 MG: 325 TABLET ORAL at 16:16

## 2023-11-09 RX ADMIN — POLYETHYLENE GLYCOL 3350 17 G: 17 POWDER, FOR SOLUTION ORAL at 09:42

## 2023-11-09 RX ADMIN — MAGNESIUM HYDROXIDE 30 ML: 400 SUSPENSION ORAL at 16:16

## 2023-11-09 RX ADMIN — LEVOTHYROXINE SODIUM 125 MCG: 125 TABLET ORAL at 09:41

## 2023-11-09 RX ADMIN — ACETAMINOPHEN 975 MG: 325 TABLET ORAL at 05:57

## 2023-11-09 RX ADMIN — ATORVASTATIN CALCIUM 20 MG: 10 TABLET, FILM COATED ORAL at 20:32

## 2023-11-09 RX ADMIN — ESTRADIOL 0.5 MG: 0.5 TABLET ORAL at 09:41

## 2023-11-09 RX ADMIN — THERA TABS 1 TABLET: TAB at 09:41

## 2023-11-09 RX ADMIN — SENNOSIDES AND DOCUSATE SODIUM 1 TABLET: 8.6; 5 TABLET ORAL at 09:41

## 2023-11-09 RX ADMIN — SENNOSIDES AND DOCUSATE SODIUM 1 TABLET: 8.6; 5 TABLET ORAL at 20:33

## 2023-11-09 RX ADMIN — SERTRALINE 50 MG: 50 TABLET, FILM COATED ORAL at 09:41

## 2023-11-09 RX ADMIN — OXYCODONE HYDROCHLORIDE 5 MG: 5 TABLET ORAL at 12:20

## 2023-11-09 RX ADMIN — OXYCODONE HYDROCHLORIDE 10 MG: 5 TABLET ORAL at 00:18

## 2023-11-09 ASSESSMENT — ACTIVITIES OF DAILY LIVING (ADL)
ADLS_ACUITY_SCORE: 25

## 2023-11-09 NOTE — PROGRESS NOTES
[facility]    Medicine Progress Note - Hospitalist Service    Date of Admission:  11/8/2023    Assessment & Plan   Leslye Lea is a 72 year old female admitted on 11/8/2023. She has a past medical history significant for degenerative disc disease, hypothyroidism, hyperlipidemia, depressive disorder, insomnia, reported history of mitral valve prolapse. Spinal fusion L5-S1 with Dr. Dorado was performed 11/8/2023. Per chart review EBL 75cc.       Changes today:  -Continue all current management     Status post L5-S1 spinal fusion  -performed 11/8/2023 by Dr. Ovi Dorado  -pain management, DVT ppx, and IV fluids per surgery     -Depressive disorder  -reported history of serotonin syndrome               -sertraline ordered at lower dose of 50 mg daily to reduce risk of serotonin syndrome              -continue to monitor patient for signs and symptoms of      -Hypothyroidism              -continue PTA levothyroxine      -Hyperlipidemia              Continue PTA atorvastatin     -Hormone replacement therapy-postmenopausal   -status post hysterectomy/oophorectomy               -Continue PTA estrace               -discussed extensively with patient that estrogen supplementation may increase the risk DVT/PE. Patient verbalized clear understanding of potential benefits and potential adverse effects of estrogen supplementation as well as alternative treatments and verbalized wanting to continue with PTA estrogen supplementation.      -Degenerative disc disease              -PTA Celebrex not ordered     Seasonal allergies              -continue PTA montelukast     Insomnia              -Patient takes temazepam nightly for sleep. She was willing to take a lower dosage during her hospitalization. Patient verbalized clear understanding of potential benefits and potential adverse effects of temazepam especially with increased age as well as alternative treatments and verbalized wanting to continue  with nightly temazepam.      Reported history of mitral valve prolapse              -patient reports she was told that she had MVP by a physician years ago. Patient states cardiac workup was performed years ago and was reassuring. No record of this on file.   -continue to monitor clinically     #CKD?- No formal diagnosis, Cr. Has been elevated dating back to at least 2021. Tolerating PO intake without difficulty. No indications to trend at this time.  -Avoid nephrotoxins          Diet: Advance Diet as Tolerated: Regular Diet Adult    DVT Prophylaxis: Defer to primary service  Harmon Catheter: Not present  Lines: None     Cardiac Monitoring: None  Code Status: Full Code      Clinically Significant Risk Factors                                    Disposition Plan      Expected Discharge Date: 11/10/2023      Destination: home with family              Sunny Jasso MD  Hospitalist Service  [facility]  Securely message with [employer] (more info)  Text page via [employer] Paging/Directory   ______________________________________________________________________    Interval History   No acute vents overnight patient was hopeful that she can go home today states that PT can cancel her afternoon session because she states they told her she was going home today as well.  Tolerating p.o. intake without difficulty denies any pain.    Physical Exam   Vital Signs: Temp: 97.8  F (36.6  C) Temp src: Oral BP: 111/54 Pulse: 71   Resp: 18 SpO2: 99 % O2 Device: None (Room air)    Weight: 122 lbs 0 oz    Gen:Alert, NAD, pleasant and sitting upright in chair  Pulm: breathing comfortably on RA.    Medical Decision Making       25 MINUTES SPENT BY ME on the date of service doing chart review, history, exam, documentation & further activities per the note.      Data   ------------------------- PAST 24 HR DATA REVIEWED -----------------------------------------------        Imaging results reviewed over the past 24 hrs:   No  results found for this or any previous visit (from the past 24 hour(s)).

## 2023-11-09 NOTE — PLAN OF CARE
"Patient vital signs are at baseline: Yes  Patient able to ambulate as they were prior to admission or with assist devices provided by therapies during their stay:  Yes  Patient MUST void prior to discharge:  no, needs 3 PVRs. Harmon was taken out this AM  Patient able to tolerate oral intake:  Yes  Pain has adequate pain control using Oral analgesics:  No,  Reason:  use of IV dilauded x1  Does patient have an identified :  Yes  Has goal D/C date and time been discussed with patient:  Yes    Pt is A&Ox4, VSS on RA. A1 with walker and gait belt when ambulating. Harmon was taken out this AM. Dressing is CDI. CMS intact. Pt is reporting moderate to severe pain during shift. Utilized scheduled and PRN medications along with non-pharm therapies for pain relief. Pt had intermittent nausea, no emesis during shift. Pt refused zofran/compazine stating she tried them earlier in the day and they didn't work for her. Non-pharm techniques utilized instead. Pt still not passing gas. Pt states she has a \"twisted colon\" and dos not pass much gas on a regular basis.      "

## 2023-11-09 NOTE — PROGRESS NOTES
Patient vital signs are at baseline: Yes  Patient able to ambulate as they were prior to admission or with assist devices provided by therapies during their stay:  Yes ambulated unit, W/GB  Patient MUST void prior to discharge:  No,  Reason:  Harmon cath patent/draining until POD 1   Patient able to tolerate oral intake:  No,  Reason:  three emesis occurrence, PRN's utilized, down graded diet and paged to remote provider for further orders.   Pain has adequate pain control using Oral analgesics:  No,  Reason:  PRN PIV dilaudid x2 admin for break though pain. PRN PO oxy x1 effective per pt   Does patient have an identified :  Yes  Has goal D/C date and time been discussed with patient:  Yes  Surgical site CDI. Calls appropriately to make needs known, alarms on for safety. Visiting with grand daughter at bedside, appears to be resting comfortably up in reclining chair.

## 2023-11-09 NOTE — PROGRESS NOTES
"Orthopedic Progress Note      Assessment: 1 Day Post-Op  S/P Procedure(s):  LUMBAR 5 - SACRAL 1 ANTERIOR DISCECTOMY, FUSION, PLATING  SURGICAL EXPOSURE, ANTERIOR APPROACH, WITH WOUND CLOSURE, FOR LUMBAR SPINE SURGERY, BY GENERAL SURGERY @ [facility].     Plan:   - Continue PT/OT  - Weightbearing status: WBAT, Patient to mobilize as tolerated.   - Anticoagulation: SCDs, terri stockings and early ambulation.  - Discharge pending passing PVRs and passing gas.   - Discharge planning: tomorrow.      Subjective:  Pain: None  Chest pain, SOB: No  Nausea, Vomiting:  No  Lightheadedness, Dizziness:  No  Neuro:  Patient denies new onset numbness or paresthesias    Patient reports doing well today. Her pain is well controlled. IV pain medications were utilized overnight.  Harmon was removed this morning and she will need to pass PVRs and pass gas before discharging.     Objective:  /57 (BP Location: Right arm)   Pulse 73   Temp 97.7  F (36.5  C) (Oral)   Resp 16   Ht 1.626 m (5' 4\")   Wt 55.3 kg (122 lb)   SpO2 98%   BMI 20.94 kg/m    The patient is A&Ox3. Appears comfortable.   Sensation is intact.  Dorsiflexion and plantar flexion is intact.  Dorsalis pedis pulse intact.  Calves are soft and non-tender. Negative Tony's.  The incision is covered. Dressing C/D/I.    No drain    Pertinent Labs   Lab Results: personally reviewed.   No results found for: \"INR\", \"PROTIME\"  No results found for: \"WBC\", \"HGB\", \"HCT\", \"MCV\", \"PLT\"  No results found for: \"NA\", \"CO2\"      Report completed by:  Charlotte Watson PA-C/Dr. [facility]    Date: 11/9/2023  Time: 9:56 AM    "

## 2023-11-09 NOTE — PLAN OF CARE
Patient vital signs are at baseline: Yes  Patient able to ambulate as they were prior to admission or with assist devices provided by therapies during their stay:  Yes  Patient MUST void prior to discharge:  Yes  Patient able to tolerate oral intake:  Yes  Pain has adequate pain control using Oral analgesics:  Yes  Does patient have an identified :  Yes  Has goal D/C date and time been discussed with patient:  Yes    Harmon removed this morning and patient voiding adequately. Passed PVRs. Pain controlled with scheduled Tylenol and prn Oxycodone. Worked with PT/OT. Up with a standby assist, gait belt, and walker. Plans to discharge to home when cleared by physician team.

## 2023-11-09 NOTE — PROGRESS NOTES
Care Management Follow Up    Length of Stay (days): 1    Expected Discharge Date: 11/10/2023     Concerns to be Addressed:       Patient plan of care discussed at interdisciplinary rounds: Yes    Anticipated Discharge Disposition:  Home with family      Additional Information:  Reviewed. Low RISK score. Pt currently having pain and will stay at this time. Pt to return home with assist with spouse. Please alert CM if needs change for discharge planning goals.     NICOLE Matos

## 2023-11-09 NOTE — PROGRESS NOTES
Physical Therapy Discharge Summary    Reason for therapy discharge:    All goals and outcomes met, no further needs identified.  No further expectations of functional progress.    Progress towards therapy goal(s). See goals on Care Plan in [facility] electronic health record for goal details.  Goals met    Therapy recommendation(s):    Continue home exercise program.

## 2023-11-09 NOTE — PROGRESS NOTES
11/09/23 1015   Appointment Info   Signing Clinician's Name / Credentials (OT) SUNSHINE Busch/L, CLT   Rehab Comments (OT) OT eval   Living Environment   People in Home spouse   Current Living Arrangements house   Self-Care   Usual Activity Tolerance good   Current Activity Tolerance good   Equipment Currently Used at Home   (standard toilet with sink nearby; shower chair built into walk-in shower;)   Fall history within last six months no   Activity/Exercise/Self-Care Comment indep with all I/ADL except shopping/dishes-standing was difficult second to pain   General Information   Onset of Illness/Injury or Date of Surgery 11/08/23   Referring Physician Dr. Dorado   Patient/Family Therapy Goal Statement (OT) home today   Additional Occupational Profile Info/Pertinent History of Current Problem s/p spine surgery   Existing Precautions/Restrictions spinal   Cognitive Status Examination   Orientation Status orientation to person, place and time   Affect/Mental Status (Cognitive) WFL   Visual Perception   Visual Impairment/Limitations WFL   Sensory   Sensory Quick Adds sensation intact   Pain Assessment   Patient Currently in Pain No   Posture   Posture not impaired   Range of Motion Comprehensive   General Range of Motion no range of motion deficits identified   Strength Comprehensive (MMT)   General Manual Muscle Testing (MMT) Assessment no strength deficits identified   Muscle Tone Assessment   Muscle Tone Quick Adds No deficits were identified   Coordination   Upper Extremity Coordination No deficits were identified   Bed Mobility   Comment (Bed Mobility) SBA   Transfers   Transfer Comments SBA   Balance   Balance Comments decreased   Activities of Daily Living   BADL Assessment/Intervention lower body dressing   Additional Documentation Comment, BADL Assessment/Training (Row)   Lower Body Dressing Assessment/Training   [place] Level (Lower Body Dressing) moderate assist (50% patient effort)   Clinical  Impression   Criteria for Skilled Therapeutic Interventions Met (OT) Yes, treatment indicated   OT Diagnosis decr ADL indep/safety   Influenced by the following impairments s/p spine surgery   OT Problem List-Impairments impacting ADL activity tolerance impaired;flexibility;mobility;post-surgical precautions   Assessment of Occupational Performance 3-5 Performance Deficits   Identified Performance Deficits dressing, home mgmt, bathing, func mob/bed mobility   Planned Therapy Interventions (OT) ADL retraining;balance training;bed mobility training;transfer training;progressive activity/exercise   Clinical Decision Making Complexity (OT) detailed assessment/moderate complexity   Risk & Benefits of therapy have been explained care plan/treatment goals reviewed;patient   OT Total Evaluation Time   OT Eval, Moderate Complexity Minutes (60051) 10   OT Goals   Therapy Frequency (OT) One time eval and treatment   OT Predicted Duration/Target Date for Goal Attainment 11/09/23   OT Goals Lower Body Dressing;Bed Mobility   OT: Lower Body Dressing Modified independent;within precautions;Goal Met;Completed   OT: Bed Mobility Modified independent;rolling;within precautions;Goal Met;Completed   Interventions   Interventions Quick Adds Self-Care/Home Management   Self-Care/Home Management   Self-Care/Home Mgmt/ADL, Compensatory, Meal Prep Minutes (47249) 25   Symptoms Noted During/After Treatment (Meal Preparation/Planning Training) fatigue   Treatment Detail/Skilled Intervention Educ pt in PLB for incr pain. Educated pt in all transfers including bed, toilet, chair, and shower. Pt mod I with FWW for bed, toilet, chair with cues for tech and PLB after instruction. Educ in LE dressing with precautions for post-surgery. Pt mod I for LE dressing including underwear/pants/socks using AE after instruction. UB dress with mod I after set-up. Bed mobility mod I with AE after instruction and educ in safe positioning. Handout given. All  questions answered. All tasks requ'd incr time/effort second to pain/fatigue.   OT Discharge Planning   OT Plan OT d/c   OT Discharge Recommendation (DC Rec) home with assist   OT Rationale for DC Rec good support at home   OT Brief overview of current status mod I for BADL   Total Session Time   Timed Code Treatment Minutes 25   Total Session Time (sum of timed and untimed services) 35

## 2023-11-09 NOTE — PROGRESS NOTES
Occupational Therapy Discharge Summary    Reason for therapy discharge:    Discharged to home.    Progress towards therapy goal(s). See goals on Care Plan in [facility] electronic health record for goal details.  Goals met    Therapy recommendation(s):    No further therapy is recommended.

## 2023-11-10 VITALS
RESPIRATION RATE: 16 BRPM | OXYGEN SATURATION: 98 % | TEMPERATURE: 98.1 F | BODY MASS INDEX: 20.83 KG/M2 | SYSTOLIC BLOOD PRESSURE: 125 MMHG | DIASTOLIC BLOOD PRESSURE: 71 MMHG | WEIGHT: 122 LBS | HEART RATE: 65 BPM | HEIGHT: 64 IN

## 2023-11-10 LAB — GLUCOSE BLDC GLUCOMTR-MCNC: 117 MG/DL (ref 70–99)

## 2023-11-10 RX ORDER — ACETAMINOPHEN 325 MG/1
650 TABLET ORAL EVERY 4 HOURS PRN
Qty: 100 TABLET | Refills: 0 | Status: SHIPPED | OUTPATIENT
Start: 2023-11-11

## 2023-11-10 RX ORDER — METHOCARBAMOL 500 MG/1
500 TABLET, FILM COATED ORAL 4 TIMES DAILY PRN
Qty: 60 TABLET | Refills: 0 | Status: SHIPPED | OUTPATIENT
Start: 2023-11-10

## 2023-11-10 RX ORDER — OXYCODONE HYDROCHLORIDE 5 MG/1
5 TABLET ORAL EVERY 4 HOURS PRN
Qty: 20 TABLET | Refills: 0 | Status: SHIPPED | OUTPATIENT
Start: 2023-11-10

## 2023-11-10 ASSESSMENT — ACTIVITIES OF DAILY LIVING (ADL)
ADLS_ACUITY_SCORE: 25

## 2023-11-10 NOTE — PROGRESS NOTES
"Orthopedic Progress Note      Assessment: 2 Days Post-Op  S/P Procedure(s):  LUMBAR 5 - SACRAL 1 ANTERIOR DISCECTOMY, FUSION, PLATING  SURGICAL EXPOSURE, ANTERIOR APPROACH, WITH WOUND CLOSURE, FOR LUMBAR SPINE SURGERY, BY GENERAL SURGERY @    Plan:   - Continue PT/OT  - Weightbearing status: WBAT can use brace for comfort  - No bending, twisting or lifting more than 10 pounds for 6 weeks.  - Anticoagulation: no chemical prophylaxis in addition to SCDs, terri stockings and early ambulation.  - Pain control at discharge: Oxycodone 5 mg 1-2 tabs Q4-6 hours x30-32 tabs, Hydroxyzine 25 mg QID PRN, Gabapentin 100 mg TID, Tizanidine 4 mg TID, Acetaminophen 1000 mg TID  - Discharge planning: home today        Subjective:  Pain: minimal   Chest pain, SOB: no  Nausea, Vomiting:  no  Lightheadedness, Dizziness:  no  Neuro:  Patient denies new onset numbness or paresthesias    Patient is doing well this morning and passing gas, voiding and ambulating well.     Objective:  /71 (BP Location: Right arm)   Pulse 65   Temp 98.1  F (36.7  C) (Oral)   Resp 16   Ht 1.626 m (5' 4\")   Wt 55.3 kg (122 lb)   SpO2 98%   BMI 20.94 kg/m    The patient is A&Ox3. Appears comfortable.   Sensation is intact.  Dorsiflexion and plantar flexion is intact.  Dorsalis pedis pulse intact.  Calves are soft and non-tender. Negative Tony's.  The incision is covered. Dressing C/D/I.      Pertinent Labs   Lab Results: personally reviewed.   No results found for: \"INR\", \"PROTIME\"  No results found for: \"WBC\", \"HGB\", \"HCT\", \"MCV\", \"PLT\"  No results found for: \"NA\", \"CO2\"      Report completed by:  Alena Schuler PA-C/Dr. [facility]    Date: 11/10/2023  Time: 8:07 AM    "

## 2023-11-10 NOTE — DISCHARGE SUMMARY
ORTHOPEDIC DISCHARGE SUMMARY       Leslye Lea,  1950, MRN [id number]    Admission Date: 2023      Admission Diagnoses: Disc degeneration, lumbosacral [M51.37]  Lumbar stenosis [M48.061]  Lumbar foraminal stenosis [M48.061]     Discharge Date:  11/10/23     Post-operative Day:  2 Days Post-Op    Reason for Admission: The patient was admitted for the following: Procedure(s):  LUMBAR 5 - SACRAL 1 ANTERIOR DISCECTOMY, FUSION, PLATING  SURGICAL EXPOSURE, ANTERIOR APPROACH, WITH WOUND CLOSURE, FOR LUMBAR SPINE SURGERY, BY GENERAL SURGERY    [facility] COURSE   Leslye Lea is a pleasant 72 year old female who underwent the aforementioned procedure with Dr. Dorado on . There were no intraoperative complications and the patient was transferred to the recovery room and later the orthopedic unit in stable condition. Once the patient reached the orthopedic floor our orthopedic pain protocol was implemented along with the following:    Anticoagulation Medications: None  Therapy: PT and OT  Activity: WBAT  Bracing: None    Consultations during Admission: Hospitalist service for medical management     COMPLICATIONS/SIGNIFICANT FINDINGS    NONE    DISCHARGE INFORMATION   Condition at discharge: Good  Discharge destination: Home  Patient was seen by    and myself on the date of discharge.    FOLLOW UP CARE   Follow up with orthopedics in 2 weeks or sooner should the need arise. Ortho will continue to manage pain control, post op anticoagulation and incision care.     Follow up with your PCP for management of chronic medical problems and to evaluate for post op medical complications including constipation, nausea/vomiting, DVT/PE, anemia, changes in blood pressure, fevers/chills, urinary retention and atelectasis/pneumonia.     Subjective   Patient is doing well on POD #1. Pain is well controlled with oral medications. Ambulating. Tolerating oral intake.     Physical Exam   /71 (BP Location: Right  "arm)   Pulse 65   Temp 98.1  F (36.7  C) (Oral)   Resp 16   Ht 1.626 m (5' 4\")   Wt 55.3 kg (122 lb)   SpO2 98%   BMI 20.94 kg/m    The patient is A&Ox3. Appears comfortable.   Sensation is intact.  Dorsiflexion and plantar flexion is intact.  Dorsalis pedis pulse intact.  Calves are soft and non-tender. Negative Tony's.  The incision is covered. Dressing C/D/I.    Pertinent Results at Discharge   No results found for: \"HGB\", \"INR\", \"PLT\"    Problem List   Principal Problem:    Lumbar foraminal stenosis      Alena Schuler PA-C/Dr. Dorado  [facility]  [phone]  Date: 11/10/2023  Time: 8:11 AM   "

## 2023-11-10 NOTE — PLAN OF CARE
Patient was wanting to leave AMA this morning due to being moved to the PT gym at 2am. She was convinced to stay until Ortho PA was able to see her this morning. Discharge instructions discussed and questions answered. All belongings sent with patient.

## 2023-11-10 NOTE — PLAN OF CARE
" Goal Outcome Evaluation:  Pt AO x4. Calm and cooperative during transfer from room to PT gym. However got very upset with noise and bright lights. Wanted to leave AMA. I spoke to pt and was able to keep her overnight. She denied pain. Room air. Independent. Passing gas. Voiding.       Problem: Adult Inpatient Plan of Care  Goal: Plan of Care Review  Description: The Plan of Care Review/Shift note should be completed every shift.  The Outcome Evaluation is a brief statement about your assessment that the patient is improving, declining, or no change.  This information will be displayed automatically on your shift  note.  Outcome: Progressing  Goal: Patient-Specific Goal (Individualized)  Description: You can add care plan individualizations to a care plan. Examples of Individualization might be:  \"Parent requests to be called daily at 9am for status\", \"I have a hard time hearing out of my right ear\", or \"Do not touch me to wake me up as it startles  me\".  Outcome: Progressing  Goal: Absence of Hospital-Acquired Illness or Injury  Outcome: Progressing  Goal: Optimal Comfort and Wellbeing  Outcome: Progressing  Goal: Readiness for Transition of Care  Outcome: Progressing                          "

## 2023-11-10 NOTE — PLAN OF CARE
Patient vital signs are at baseline: Yes  Patient able to ambulate as they were prior to admission or with assist devices provided by therapies during their stay:  Yes  Patient MUST void prior to discharge:  Yes  Patient able to tolerate oral intake:  Yes  Pain has adequate pain control using Oral analgesics:  Yes  Does patient have an identified :  Yes  Has goal D/C date and time been discussed with patient:  Yes    Reporting minimal pain. Active bowel sounds but has not been able to pass flatus. Denies nausea. Up ambulating independently. Dressing CDI.

## 2023-11-14 ENCOUNTER — DOCUMENTATION ONLY (OUTPATIENT)
Dept: OTHER | Facility: CLINIC | Age: 73
End: 2023-11-14
Payer: MEDICARE

## 2024-05-27 ENCOUNTER — APPOINTMENT (OUTPATIENT)
Dept: RADIOLOGY | Facility: CLINIC | Age: 74
End: 2024-05-27
Attending: EMERGENCY MEDICINE
Payer: MEDICARE

## 2024-05-27 ENCOUNTER — HOSPITAL ENCOUNTER (EMERGENCY)
Facility: CLINIC | Age: 74
Discharge: HOME | End: 2024-05-27
Attending: EMERGENCY MEDICINE | Admitting: EMERGENCY MEDICINE
Payer: MEDICARE

## 2024-05-27 VITALS
DIASTOLIC BLOOD PRESSURE: 56 MMHG | RESPIRATION RATE: 49 BRPM | TEMPERATURE: 98.6 F | HEIGHT: 64 IN | HEART RATE: 79 BPM | OXYGEN SATURATION: 100 % | WEIGHT: 127 LBS | BODY MASS INDEX: 21.68 KG/M2 | SYSTOLIC BLOOD PRESSURE: 123 MMHG

## 2024-05-27 DIAGNOSIS — S40.021A ARM CONTUSION, RIGHT, INITIAL ENCOUNTER: ICD-10-CM

## 2024-05-27 DIAGNOSIS — S43.004A SHOULDER DISLOCATION, RIGHT, INITIAL ENCOUNTER: ICD-10-CM

## 2024-05-27 DIAGNOSIS — S80.812A ABRASION OF LEFT LEG, INITIAL ENCOUNTER: ICD-10-CM

## 2024-05-27 DIAGNOSIS — S42.291A HILL SACHS DEFORMITY, RIGHT: ICD-10-CM

## 2024-05-27 PROCEDURE — 99152 MOD SED SAME PHYS/QHP 5/>YRS: CPT

## 2024-05-27 PROCEDURE — 258N000003 HC RX IP 258 OP 636: Mod: JZ | Performed by: EMERGENCY MEDICINE

## 2024-05-27 PROCEDURE — 23655 CLTX SHO DSLC W/MNPJ W/ANES: CPT | Mod: RT

## 2024-05-27 PROCEDURE — 999N000065 XR SHOULDER RIGHT PORT G/E 2 VIEWS: Mod: RT

## 2024-05-27 PROCEDURE — 99285 EMERGENCY DEPT VISIT HI MDM: CPT | Mod: 25

## 2024-05-27 PROCEDURE — 250N000011 HC RX IP 250 OP 636: Performed by: EMERGENCY MEDICINE

## 2024-05-27 PROCEDURE — 999N000157 HC STATISTIC RCP TIME EA 10 MIN

## 2024-05-27 PROCEDURE — 96374 THER/PROPH/DIAG INJ IV PUSH: CPT | Mod: 59

## 2024-05-27 PROCEDURE — 96375 TX/PRO/DX INJ NEW DRUG ADDON: CPT

## 2024-05-27 PROCEDURE — 999N000055 HC STATISTIC END TITIAL CO2 MONITORING

## 2024-05-27 PROCEDURE — 73030 X-RAY EXAM OF SHOULDER: CPT | Mod: RT

## 2024-05-27 PROCEDURE — 96361 HYDRATE IV INFUSION ADD-ON: CPT

## 2024-05-27 RX ORDER — LIDOCAINE 40 MG/G
CREAM TOPICAL
Status: DISCONTINUED | OUTPATIENT
Start: 2024-05-27 | End: 2024-05-27 | Stop reason: HOSPADM

## 2024-05-27 RX ORDER — HYDROCODONE BITARTRATE AND ACETAMINOPHEN 5; 325 MG/1; MG/1
1 TABLET ORAL EVERY 6 HOURS PRN
Qty: 10 TABLET | Refills: 0 | Status: SHIPPED | OUTPATIENT
Start: 2024-05-27 | End: 2024-05-30

## 2024-05-27 RX ORDER — ONDANSETRON 2 MG/ML
4 INJECTION INTRAMUSCULAR; INTRAVENOUS ONCE
Status: COMPLETED | OUTPATIENT
Start: 2024-05-27 | End: 2024-05-27

## 2024-05-27 RX ORDER — SODIUM CHLORIDE 9 MG/ML
INJECTION, SOLUTION INTRAVENOUS CONTINUOUS PRN
Status: COMPLETED | OUTPATIENT
Start: 2024-05-27 | End: 2024-05-27

## 2024-05-27 RX ORDER — PROPOFOL 10 MG/ML
50 INJECTION, EMULSION INTRAVENOUS ONCE
Status: COMPLETED | OUTPATIENT
Start: 2024-05-27 | End: 2024-05-27

## 2024-05-27 RX ADMIN — ONDANSETRON 4 MG: 2 INJECTION INTRAMUSCULAR; INTRAVENOUS at 12:22

## 2024-05-27 RX ADMIN — PROPOFOL 20 MG: 10 INJECTION, EMULSION INTRAVENOUS at 12:45

## 2024-05-27 RX ADMIN — HYDROMORPHONE HYDROCHLORIDE 1 MG: 1 INJECTION, SOLUTION INTRAMUSCULAR; INTRAVENOUS; SUBCUTANEOUS at 12:12

## 2024-05-27 RX ADMIN — PROPOFOL 20 MG: 10 INJECTION, EMULSION INTRAVENOUS at 12:46

## 2024-05-27 RX ADMIN — PROPOFOL 40 MG: 10 INJECTION, EMULSION INTRAVENOUS at 12:43

## 2024-05-27 RX ADMIN — SODIUM CHLORIDE 500 ML: 0.9 INJECTION, SOLUTION INTRAVENOUS at 12:38

## 2024-05-27 ASSESSMENT — ACTIVITIES OF DAILY LIVING (ADL)
ADLS_ACUITY_SCORE: 38

## 2024-05-27 NOTE — ED NOTES
Patient and  said they understood directions.    Was not able to make [place] appt since clinic is closed.  Said she would make her appointment online when she gets home.

## 2024-05-27 NOTE — ED TRIAGE NOTES
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  Was up on a ladder in a laundry room, when she estimates that she fell approximate feet onto her right shoulder.  Obvious deformity of right shoulder.    Said that she had rotator cuff surgery on the same shoulder 4-5 years ago.

## 2024-05-27 NOTE — ED PROVIDER NOTES
"EMERGENCY DEPARTMENT ENCOUNTER      NAME: Leslye Lea  AGE: 73 year old female  YOB: 1950  MRN: [id number]  EVALUATION DATE & TIME: 5/27/2024 11:52 AM    PCP: Callum Holly    ED PROVIDER: Lj Al MD      Chief Complaint   Patient presents with    Shoulder Injury     Right shoulder dislocation         FINAL IMPRESSION:  Right shoulder reduction  Right shoulder dislocation  Right Hill-Sachs deformity  Left tibial abrasion  Right elbow laceration  Right elbow contusion  ED COURSE & MEDICAL DECISION MAKING:    Pertinent Labs & Imaging studies reviewed. (See chart for details)  73 year old female presents to the Emergency Department for evaluation of injuries after a fall from a ladder.  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  Denies striking her head.  No loss of consciousness.  Reports severe discomfort in the right shoulder stating \"I dislocated my shoulder\".  Patient has had previous rotator cuff repair in same shoulder.  Exam also reveals deep abrasion to the tibial spine on the distal left and small laceration to the left elbow.  Sensation and capillary refill intact distally.  Patient given morphine 8 mg and route.  Dilaudid 1 mg IV given now for additional pain relief.  Films will be obtained to assess for fracture.  Given the severity of her discomfort will likely require sedation and reduction.    12:16 PM I introduced myself to the patient, obtained patient history, performed a physical exam, and discussed plan for ED workup including potential diagnostic laboratory/imaging studies and interventions.  12:35 PM.  Patient's films being obtained.  No overt fracture noted.  Verbal consent given for sedation.  Patient reports having propofol previously with colonoscopy without difficulties.  Medications ordered and respiratory therapy called.  12:52 PM patient sedated without difficulties.  Remained talking slightly throughout.  Initial " attempt at reduction with hyperextension unsuccessful.  Then traction and external rotation performed with easy reduction.  Patient tolerated procedure well.  Verbalizing shortly thereafter.  No memory of the event.   returned from waiting room to sit with patient.  Bruising noted to the medial aspect of her right arm prior to reduction  1:29 PM.  Films reviewed.  Adequate reduction noted.  Potential Hill-Sachs deformity.  Awaiting definitive report   1:42 PM.  X-rays with likely Hill-Sachs deformity.  Adequate reduction.  Results shared with patient.  Plan for follow-up with orthopedics.    At the conclusion of the encounter I discussed the results of all of the tests and the disposition. The questions were answered. The patient or family acknowledged understanding and was agreeable with the care plan.     Medical Decision Making  Obtained supplemental history:Supplemental history obtained?: No  Reviewed external records: External records reviewed?: No  Care impacted by chronic illness:N/A  Care significantly affected by social determinants of health:N/A  Did you consider but not order tests?: Work up considered but not performed and documented in chart, if applicable  Did you interpret images independently?: Independent interpretation of ECG and images noted in documentation, when applicable.  Consultation discussion with other provider:Did you involve another provider (consultant, MH, pharmacy, etc.)?: No  Discharge. I prescribed additional prescription strength medication(s) as charted. I considered admission, but discharged patient after significant clinical improvement.      0 minutes of critical care time     MEDICATIONS GIVEN IN THE EMERGENCY:  Medications   lidocaine 1 % 0.1-1 mL (has no administration in time range)   lidocaine (LMX4) cream (has no administration in time range)   sodium chloride (PF) 0.9% PF flush 3 mL (has no administration in time range)   sodium chloride (PF) 0.9% PF flush 3 mL  (3 mLs Intracatheter $Given 5/27/24 1223)   ondansetron (ZOFRAN) injection 4 mg (4 mg Intravenous $Given 5/27/24 1222)   HYDROmorphone (DILAUDID) injection 1 mg (1 mg Intravenous $Given 5/27/24 1212)       NEW PRESCRIPTIONS STARTED AT TODAY'S ER VISIT  Current Discharge Medication List        Results for orders placed or performed during the hospital encounter of 05/27/24   Shoulder XR, right, 2-3 vw PORTABLE     Status: None    Narrative    EXAM: XR SHOULDER RIGHT PORT G/E 2 VIEWS  LOCATION: [facility]  DATE: 5/27/2024    INDICATION: Fall, shoulder dislocation, possible fracture.    COMPARISON: None.      Impression    IMPRESSION: The labeled shoulder Y view is nonstandard/nondiagnostic.     Anterior right shoulder/glenohumeral joint dislocation. Likely Hill-Sachs impaction fracture. Degenerative change right AC joint. Visualized lung apices are grossly clear. No definitive right clavicle fracture. Degenerative change visualized spine.    NOTE: ABNORMAL REPORT    THE DICTATION ABOVE DESCRIBES AN ABNORMALITY FOR WHICH FOLLOW-UP IS NEEDED.     Shoulder XR, right, 2-3 vw PORTABLE     Status: None (Preliminary result)    Narrative    EXAM: XR SHOULDER RIGHT PORT G/E 2 VIEWS  LOCATION: [facility]  DATE: 5/27/2024    INDICATION: Post reduction.  COMPARISON: Earlier today.      Impression    IMPRESSION: Interval reduction of the previously seen anterior right shoulder/glenohumeral joint dislocation. Probable Hill-Sachs impaction fracture of the humeral head. No definitive glenoid fracture. Unchanged acromioclavicular joint osteoarthritis.   Visualized right lung is grossly clear. Degenerative change visualized spine.                     =================================================================    HPI    Patient information was obtained from: Patient    Use of : N/A         Leslyejoe Lea is a 73 year old female with no significant history who presents  to this ED via EMS for evaluation of shoulder injury.     The patient reports shoulder and elbow injury due to a fall off of an 8 foot ladder. She feels as though she dislocated her shoulder, requesting more pain medicine. Patient notes she was able to walk after the fall. She denies being on blood thinners.     EMS states she was given 8 mL of morphine. No other pertinent history.       REVIEW OF SYSTEMS   Review of Systems   Musculoskeletal:         Positive for shoulder and elbow pain.        PAST MEDICAL HISTORY:  Past Medical History:   Diagnosis Date    Hypothyroid        PAST SURGICAL HISTORY:  Past Surgical History:   Procedure Laterality Date    APPENDECTOMY  2001    BLEPHAROPLASTY, BROW LIFT, COMBINED Bilateral 5/13/2021    Procedure: Bilateral Upper Eyelid Blepharoplasty, Bilateral Pretrichial Browlift;  Surgeon: Sammie Faria MD;  Location: [facility] OR    BUNIONECTOMY Bilateral     excision of cecum  2001    FUSION LUMBAR ANTERIOR ONE LEVEL N/A 11/8/2023    Procedure: LUMBAR 5 - SACRAL 1 ANTERIOR DISCECTOMY, FUSION, PLATING;  Surgeon: Ovi Dorado MD;  Location: [facility] OR    hemmorrhoidectomy  2005    HYSTERECTOMY  2000    lymph node biopsy  2001    SURGICAL EXPOSURE, ANTERIOR APPROACH, W/WOUND CLOSURE, FOR LUMBAR SPINE SURGERY, BY GENERAL SURGERY N/A 11/8/2023    Procedure: SURGICAL EXPOSURE, ANTERIOR APPROACH, WITH WOUND CLOSURE, FOR LUMBAR SPINE SURGERY, BY GENERAL SURGERY;  Surgeon: Ovi Dorado MD;  Location: [facility] OR    THYROIDECTOMY  1999     BREAST CYST ASPIRATION (GICH) Bilateral            CURRENT MEDICATIONS:    acetaminophen (TYLENOL) 325 MG tablet  atorvastatin (LIPITOR) 20 MG tablet  Bioflavonoid Products (VITAMIN C CR) 1000-100 MG TBCR  estradiol (ESTRACE) 0.5 MG tablet  glucosamine-chondroitin 500-400 MG CAPS per capsule  HEMP OIL OR EXTRACT OR OTHER CBD CANNABINOID, NOT MEDICAL CANNABIS,  lactobacillus rhamnosus, GG, (CULTURELL)  "capsule  levothyroxine (SYNTHROID/LEVOTHROID) 125 MCG tablet  methocarbamol (ROBAXIN) 500 MG tablet  montelukast (SINGULAIR) 10 MG tablet  Multiple Vitamin (MULTI-VITAMINS) TABS  oxyCODONE (ROXICODONE) 5 MG tablet  sertraline (ZOLOFT) 100 MG tablet  temazepam (RESTORIL) 30 MG capsule  Vitamin D3 50 mcg (2000 units) tablet        ALLERGIES:  Allergies   Allergen Reactions    Seasonal Allergies     Keflex [Cephalexin] Rash     Lip swelling, urticaria    Trazodone Other (See Comments), Muscle Pain (Myalgia) and Cough     Night sweats       FAMILY HISTORY:  Family History   Problem Relation Age of Onset    Breast Cancer Mother     Transient ischemic attack Mother     Alzheimer Disease Mother        SOCIAL HISTORY:   Social History     Socioeconomic History    Marital status:    Tobacco Use    Smoking status: Never    Smokeless tobacco: Never   Substance and Sexual Activity    Alcohol use: Yes     Comment: occas    Drug use: Never     Social Determinants of Health      [facility]    Financial Resource Strain    [facility]    Social Connections       VITALS:  Ht 1.626 m (5' 4\")   Wt 57.6 kg (127 lb)   BMI 21.80 kg/m      PHYSICAL EXAM    VITAL SIGNS: Ht 1.626 m (5' 4\")   Wt 57.6 kg (127 lb)   BMI 21.80 kg/m      Constitutional:  Awake, alert, in moderate-marked distress  HENT:  Normocephalic, Atraumatic. Bilateral external ears normal. Oropharynx moist. Nose normal. Neck- Normal range of motion with no guarding, No midline cervical tenderness, Supple, No stridor.   Eyes:  PERRL, EOMI with no signs of entrapment, Conjunctiva normal, No discharge.   Respiratory:  Normal breath sounds, No respiratory distress, No wheezing.    Cardiovascular:  Normal heart rate, Normal rhythm, No appreciable rubs or gallops.   GI:  Soft, No " tenderness, No distension, No palpable masses  Musculoskeletal:  Intact distal pulses, No edema. Good range of motion in all major joints. 1 cm laceration on posterior right elbow. 3 cm linear deep abrasion on left distal tibial spine. Step-off right shoulder joint.  Decreased range of motion.  Elbow supple and nontender.  Wrist supple and nontender.  Integument:  Warm, Dry, No erythema, No rash.    Neurologic:  Alert & oriented, Normal motor function, Normal sensory function, No focal deficits noted.   Psychiatric:  Affect normal, Judgment normal, Mood normal.      LAB:  All pertinent labs reviewed and interpreted.  No results found for any visits on 05/27/24.    RADIOLOGY:  Reviewed all pertinent imaging. Please see official radiology report.  Shoulder XR, right, 2-3 vw PORTABLE    (Results Pending)       EKG:    None.    PROCEDURES:   PROCEDURE: Procedural Sedation  Orthopedic Injury Reduction   INDICATIONS: Sedation is required to allow for joint reduction (right shoulder)   SEDATION PROVIDER: Dr Lj Al   PROCEDURE PROVIDER: Dr Lj Al   LEVEL OF SEDATION: Moderate Sedation    Defined as:  Minimal = Normal response to verbal  Moderate = Responds to verbal and light tactile stimulation  Deep = Responds after repeated painful stimulation   CONSENT: Risks, benefits and alternatives were discussed with and Verbal consent was obtained from Patient.   PROCEDURE SPECIFIC CHECKLIST COMPLETED:   Yes   LAST ORAL INTAKE: Full Liquids > 4 hours   ASA CLASS: 2 - Mild systemic disease   MALLAMPATI:  II - Faucial pillars and soft palate may be seen, but uvula is masked by the base of the tongue   TIME OUT: Universal protocol was followed. TIME OUT conducted just prior to starting procedure confirmed patient identity, site/side, procedure, patient position, and availability of correct equipment. Yes    Immediately prior to initiation of sedation, reassessment of clinical condition was performed which was unchanged.    MEDICATIONS: Propofol, 60 mg, IV   MONITORING: Monitoring consisted of:   heart rate, cardiac monitor, continuous pulse oximeter, continuous capnometry (end tidal CO2), frequent blood pressure checks, level of consciousness checks, IV access, constant attendance by RN until patient is recovered, and constant attendance by MD until patient is stable   RESPONSE: vital signs stable, airway patent, and O2 saturations remained >92%   REDUCTION   PROCEDURE DESCRIPTION: ORTHOPEDIC MANAGEMENT:  Bone/Joint Manipulation: Affected extremity was grasped and gradual traction was applied and was further manipulated manually until alignment and positioning were improved.      POST-SEDATION ASSESSMENT/NOTE: Lowest level oxygen saturation reached was 98%.    Post procedure patient was alert and responds to verbal stimuli    Patient was monitored during recovery and returned to pre-procedure baseline.   ADDITIONAL MD ASSISTANCE: None   TOTAL MD DRUG ADMINISTRATION / MONITORING TIME: 15 minutes   COMPLICATIONS:  Patient tolerated procedure well, without complication         I, Steffany Goldstein, am serving as a scribe to document services personally performed by Lj Al MD based on my observation and the provider's statements to me. I, Lj Al MD, attest that Steffany Goldstein is acting in a scribe capacity, has observed my performance of the services and has documented them in accordance with my direction.    Lj Al MD  [facility] EMERGENCY ROOM  [address] [facility] [id number] [phone]     Lj Al MD  05/27/24 0991

## 2024-05-27 NOTE — PROGRESS NOTES
Rt present for a conscious sedation. Pt right shoulder dislocated. PT on 3 lpm NC, SPO2 %, HR 78-79, RR 20-30, ETCO2 29-35. Pt had a short period of apnea and RT did jaw thrust. Pt tolerated well, shoulder back in place. Pt awoke and was stable. RT dismissed.   Sapna Cornejo, RT

## 2024-09-07 ENCOUNTER — LAB REQUISITION (OUTPATIENT)
Dept: LAB | Facility: CLINIC | Age: 74
End: 2024-09-07
Payer: MEDICARE

## 2024-09-07 DIAGNOSIS — M25.519 PAIN IN UNSPECIFIED SHOULDER: ICD-10-CM

## 2024-09-07 LAB
APPEARANCE FLD: ABNORMAL
CELL COUNT BODY FLUID SOURCE: ABNORMAL
COLOR FLD: ABNORMAL
LYMPHOCYTES NFR FLD MANUAL: 6 %
MONOS+MACROS NFR FLD MANUAL: 27 %
NEUTS BAND NFR FLD MANUAL: 67 %
WBC # FLD AUTO: 1288 /UL

## 2024-09-07 PROCEDURE — 87075 CULTR BACTERIA EXCEPT BLOOD: CPT | Mod: ORL | Performed by: PHYSICIAN ASSISTANT

## 2024-09-07 PROCEDURE — 87070 CULTURE OTHR SPECIMN AEROBIC: CPT | Mod: ORL | Performed by: PHYSICIAN ASSISTANT

## 2024-09-07 PROCEDURE — 89051 BODY FLUID CELL COUNT: CPT | Mod: ORL | Performed by: PHYSICIAN ASSISTANT

## 2024-09-12 LAB — BACTERIA SNV CULT: NO GROWTH

## 2024-09-17 ENCOUNTER — LAB REQUISITION (OUTPATIENT)
Dept: LAB | Facility: CLINIC | Age: 74
End: 2024-09-17
Payer: MEDICARE

## 2024-09-17 DIAGNOSIS — M25.511 PAIN IN RIGHT SHOULDER: ICD-10-CM

## 2024-09-17 LAB
APPEARANCE FLD: ABNORMAL
BACTERIA SPEC CULT: NORMAL
BASOPHILS NFR FLD MANUAL: 1 %
CELL COUNT BODY FLUID SOURCE: ABNORMAL
COLOR FLD: ABNORMAL
CRYSTALS SNV MICRO: NORMAL
GRAM STAIN RESULT: NORMAL
GRAM STAIN RESULT: NORMAL
LYMPHOCYTES NFR FLD MANUAL: 12 %
MONOS+MACROS NFR FLD MANUAL: 20 %
NEUTS BAND NFR FLD MANUAL: 68 %
WBC # FLD AUTO: 452 /UL

## 2024-09-17 PROCEDURE — 87070 CULTURE OTHR SPECIMN AEROBIC: CPT | Mod: ORL | Performed by: ORTHOPAEDIC SURGERY

## 2024-09-17 PROCEDURE — 87075 CULTR BACTERIA EXCEPT BLOOD: CPT | Mod: ORL | Performed by: ORTHOPAEDIC SURGERY

## 2024-09-17 PROCEDURE — 89060 EXAM SYNOVIAL FLUID CRYSTALS: CPT | Mod: ORL | Performed by: ORTHOPAEDIC SURGERY

## 2024-09-17 PROCEDURE — 89051 BODY FLUID CELL COUNT: CPT | Mod: ORL | Performed by: ORTHOPAEDIC SURGERY

## 2024-09-17 PROCEDURE — 87476 LYME DIS DNA AMP PROBE: CPT | Mod: ORL | Performed by: ORTHOPAEDIC SURGERY

## 2024-09-17 PROCEDURE — 87205 SMEAR GRAM STAIN: CPT | Mod: ORL | Performed by: ORTHOPAEDIC SURGERY

## 2024-09-20 LAB — B BURGDOR DNA SPEC QL NAA+PROBE: NOT DETECTED

## 2024-09-21 ENCOUNTER — HEALTH MAINTENANCE LETTER (OUTPATIENT)
Age: 74
End: 2024-09-21

## 2024-09-21 LAB — BACTERIA SNV CULT: NORMAL

## 2024-09-22 LAB — BACTERIA SNV CULT: NO GROWTH

## 2024-10-01 LAB — BACTERIA SNV CULT: NORMAL

## 2025-08-30 ENCOUNTER — HEALTH MAINTENANCE LETTER (OUTPATIENT)
Age: 75
End: 2025-08-30

## (undated) DEVICE — DRSG TEGADERM 2 3/8X2 3/4" 1624W

## (undated) DEVICE — TUBING SUCTION 12"X1/4" N612

## (undated) DEVICE — SPONGE RAY-TEC 4X8" 7318

## (undated) DEVICE — SPONGE KITNER DISSECTING 7102*

## (undated) DEVICE — ESU CORD BIPOLAR GREEN 10-4000

## (undated) DEVICE — ESU EYE HIGH TEMP 65410-183

## (undated) DEVICE — BLADE KNIFE SURG 11 371111

## (undated) DEVICE — SOL WATER IRRIG 1000ML BOTTLE 2F7114

## (undated) DEVICE — SOL NACL 0.9% IRRIG 1000ML BOTTLE 2F7124

## (undated) DEVICE — SU MONOCRYL 3-0 SH 27" UND Y416H

## (undated) DEVICE — BLADE KNIFE SURG 15 371115

## (undated) DEVICE — SU PROLENE 5-0 PS-3 18" 8681G

## (undated) DEVICE — DRSG STERI STRIP 1/4X4" R1546

## (undated) DEVICE — DRSG KERLIX FLUFFS X5

## (undated) DEVICE — PEN MARKING SKIN TYCO DEVON DUAL TIP 31145868

## (undated) DEVICE — DRAPE SHEET HALF 40X60" 9358

## (undated) DEVICE — ESU NDL COLORADO MICRO 3CM STR N103A

## (undated) DEVICE — GLOVE PROTEXIS MICRO 7.0  2D73PM70

## (undated) DEVICE — PACK MINOR EYE CUSTOM ASC

## (undated) DEVICE — BNDG COBAN 2"X5YDS UNSTERILE 2082

## (undated) DEVICE — BASIN SET SINGLE STERILE 13752-624

## (undated) DEVICE — GLOVE BIOGEL PI INDICATOR 8.0 LF 41680

## (undated) DEVICE — SU PDS II 3-0 RB-1 27" Z305H

## (undated) DEVICE — PREP CHLORAPREP 26ML TINTED HI-LITE ORANGE 930815

## (undated) DEVICE — NDL 27GA 1.25" 305136

## (undated) DEVICE — SU ETHILON 4-0 PC-3 18" 1864G

## (undated) DEVICE — SU ETHILON 5-0 P-3 18" BLACK 698G

## (undated) DEVICE — SYR BULB IRRIG DOVER 60 ML LATEX FREE 67000

## (undated) DEVICE — DRSG PRIMAPORE 04X11 3/4"

## (undated) DEVICE — RX BACITRACIN OINTMENT 0.9G 1/32OZ 01680 11109

## (undated) DEVICE — SYR 03ML LL W/O NDL 309657

## (undated) DEVICE — PAD CHUX UNDERPAD 30X30"

## (undated) DEVICE — CELL SAVER

## (undated) DEVICE — SU VICRYL+ 3-0 27IN SH UND VCP416H

## (undated) DEVICE — CUSTOM PACK LUMBAR FUSION SNE5BLFHEA

## (undated) DEVICE — STPL SKIN 35W ROTATING HEAD PRW35

## (undated) DEVICE — GLOVE UNDER INDICATOR PI SZ 7.0 LF 41670

## (undated) DEVICE — NDL 30GA 0.5" 305106

## (undated) DEVICE — GLOVE BIOGEL PI ULTRATOUCH G SZ 7.5 42175

## (undated) DEVICE — SU PDS II 3-0 SH 27" Z316H

## (undated) DEVICE — GLOVE SURG PI ULTRA TOUCH M SZ 8 LF

## (undated) DEVICE — LINEN TOWEL PACK X5 5464

## (undated) DEVICE — ESU PENCIL SMOKE EVAC W/ROCKER SWITCH 0703-047-000

## (undated) DEVICE — SUCTION TIP POOLE STERILE 35040

## (undated) DEVICE — DRSG KERLIX 2 1/4"X3YDS ROLL 6720

## (undated) DEVICE — PACK MINOR SINGLE BASIN SSK3001

## (undated) DEVICE — ESU LIGASURE MARYLAND JAW OPEN VESSEL 23CM LF1923

## (undated) DEVICE — SU PLAIN FAST ABSORB 5-0 PC-1 18" 1915G

## (undated) DEVICE — SUCTION MANIFOLD NEPTUNE 2 SYS 1 PORT 702-025-000

## (undated) DEVICE — GOWN IMPERVIOUS BREATHABLE SMART LG 89015

## (undated) DEVICE — DRAPE C-ARMOR 5 SIDED 5523

## (undated) DEVICE — CATH IV 14GA 2IN REM FLASHPLUG DEHP-FR PVC FR 4251717-02

## (undated) DEVICE — SU MONOCRYL 4-0 PS-2 27" UND Y426H

## (undated) DEVICE — SYR 10ML FINGER CONTROL W/O NDL 309695

## (undated) DEVICE — SOL NACL 0.9% IRRIG 500ML BOTTLE 2F7123

## (undated) DEVICE — SUCTION MANIFOLD NEPTUNE 2 SYS 4 PORT 0702-020-000

## (undated) DEVICE — PEN MARKING SKIN W/PAPER RULER 31145785

## (undated) DEVICE — SUTURE PDS 0 60IN CTX+ LOOPED PDP990G

## (undated) DEVICE — ESU ELEC BLADE 6" COATED E1450-6

## (undated) DEVICE — GLOVE SURG PI ULTRA TOUCH M SZ 7 LF 42670

## (undated) DEVICE — GLOVE PROTEXIS BLUE W/NEU-THERA 7.5  2D73EB75

## (undated) DEVICE — SOL WATER IRRIG 500ML BOTTLE 2F7113

## (undated) DEVICE — DRAPE STERI TOWEL LG 1010

## (undated) DEVICE — PLATE GROUNDING ADULT W/CORD 9165L

## (undated) DEVICE — ESU GROUND PAD ADULT W/CORD E7507

## (undated) DEVICE — SUTURE SILK 2-0 TIES 30IN SA85H

## (undated) DEVICE — SYR EAR BULB 3OZ 0035830

## (undated) DEVICE — HLSTR JET MULTI-INST SFTY STRL FIRE-SFE 7212-12

## (undated) DEVICE — ESU PENCIL W/COATED BLADE E2450H

## (undated) DEVICE — DRSG STERI STRIP 1/2X4" R1547

## (undated) DEVICE — DRAPE BACK TABLE PADDED 60X90

## (undated) DEVICE — EYE PREP BETADINE 5% SOLUTION 30ML 0065-0411-30

## (undated) DEVICE — CATH TRAY FOLEY SURESTEP 16FR DRAIN BAG STATOCK A899916

## (undated) DEVICE — SUTURE VICRYL+ 2-0 27IN CT-1 UND VCP259H

## (undated) RX ORDER — BUPIVACAINE HYDROCHLORIDE 5 MG/ML
INJECTION, SOLUTION EPIDURAL; INTRACAUDAL
Status: DISPENSED
Start: 2021-05-13

## (undated) RX ORDER — ERYTHROMYCIN 5 MG/G
OINTMENT OPHTHALMIC
Status: DISPENSED
Start: 2021-05-13

## (undated) RX ORDER — PROPOFOL 10 MG/ML
INJECTION, EMULSION INTRAVENOUS
Status: DISPENSED
Start: 2021-05-13

## (undated) RX ORDER — GABAPENTIN 100 MG/1
CAPSULE ORAL
Status: DISPENSED
Start: 2021-05-13

## (undated) RX ORDER — ONDANSETRON 2 MG/ML
INJECTION INTRAMUSCULAR; INTRAVENOUS
Status: DISPENSED
Start: 2023-11-08

## (undated) RX ORDER — LIDOCAINE HYDROCHLORIDE 20 MG/ML
INJECTION, SOLUTION EPIDURAL; INFILTRATION; INTRACAUDAL; PERINEURAL
Status: DISPENSED
Start: 2021-05-13

## (undated) RX ORDER — LIDOCAINE HYDROCHLORIDE AND EPINEPHRINE 15; 5 MG/ML; UG/ML
INJECTION, SOLUTION EPIDURAL
Status: DISPENSED
Start: 2021-05-13

## (undated) RX ORDER — BALANCED SALT SOLUTION 6.4; .75; .48; .3; 3.9; 1.7 MG/ML; MG/ML; MG/ML; MG/ML; MG/ML; MG/ML
SOLUTION OPHTHALMIC
Status: DISPENSED
Start: 2021-05-13

## (undated) RX ORDER — DEXAMETHASONE SODIUM PHOSPHATE 10 MG/ML
INJECTION, EMULSION INTRAMUSCULAR; INTRAVENOUS
Status: DISPENSED
Start: 2023-11-08

## (undated) RX ORDER — GABAPENTIN 300 MG/1
CAPSULE ORAL
Status: DISPENSED
Start: 2021-05-13

## (undated) RX ORDER — DEXAMETHASONE SODIUM PHOSPHATE 4 MG/ML
INJECTION, SOLUTION INTRA-ARTICULAR; INTRALESIONAL; INTRAMUSCULAR; INTRAVENOUS; SOFT TISSUE
Status: DISPENSED
Start: 2021-05-13

## (undated) RX ORDER — ONDANSETRON 2 MG/ML
INJECTION INTRAMUSCULAR; INTRAVENOUS
Status: DISPENSED
Start: 2021-05-13

## (undated) RX ORDER — FENTANYL CITRATE 50 UG/ML
INJECTION, SOLUTION INTRAMUSCULAR; INTRAVENOUS
Status: DISPENSED
Start: 2021-05-13

## (undated) RX ORDER — GLYCOPYRROLATE 0.2 MG/ML
INJECTION, SOLUTION INTRAMUSCULAR; INTRAVENOUS
Status: DISPENSED
Start: 2023-11-08

## (undated) RX ORDER — HYDROMORPHONE HYDROCHLORIDE 1 MG/ML
INJECTION, SOLUTION INTRAMUSCULAR; INTRAVENOUS; SUBCUTANEOUS
Status: DISPENSED
Start: 2021-05-13

## (undated) RX ORDER — PROPOFOL 10 MG/ML
INJECTION, EMULSION INTRAVENOUS
Status: DISPENSED
Start: 2023-11-08

## (undated) RX ORDER — FENTANYL CITRATE 50 UG/ML
INJECTION, SOLUTION INTRAMUSCULAR; INTRAVENOUS
Status: DISPENSED
Start: 2023-11-08

## (undated) RX ORDER — ACETAMINOPHEN 325 MG/1
TABLET ORAL
Status: DISPENSED
Start: 2021-05-13

## (undated) RX ORDER — CLINDAMYCIN PHOSPHATE 900 MG/50ML
INJECTION, SOLUTION INTRAVENOUS
Status: DISPENSED
Start: 2021-05-13

## (undated) RX ORDER — LIDOCAINE HYDROCHLORIDE AND EPINEPHRINE 10; 10 MG/ML; UG/ML
INJECTION, SOLUTION INFILTRATION; PERINEURAL
Status: DISPENSED
Start: 2021-05-13

## (undated) RX ORDER — LIDOCAINE HYDROCHLORIDE 10 MG/ML
INJECTION, SOLUTION EPIDURAL; INFILTRATION; INTRACAUDAL; PERINEURAL
Status: DISPENSED
Start: 2023-11-08

## (undated) RX ORDER — OXYCODONE HYDROCHLORIDE 5 MG/1
TABLET ORAL
Status: DISPENSED
Start: 2021-05-13

## (undated) RX ORDER — EPHEDRINE SULFATE 50 MG/ML
INJECTION, SOLUTION INTRAMUSCULAR; INTRAVENOUS; SUBCUTANEOUS
Status: DISPENSED
Start: 2021-05-13